# Patient Record
Sex: FEMALE | Race: WHITE | NOT HISPANIC OR LATINO | Employment: OTHER | URBAN - METROPOLITAN AREA
[De-identification: names, ages, dates, MRNs, and addresses within clinical notes are randomized per-mention and may not be internally consistent; named-entity substitution may affect disease eponyms.]

---

## 2017-01-10 ENCOUNTER — OFFICE VISIT (OUTPATIENT)
Dept: ORTHOPEDICS | Facility: CLINIC | Age: 72
End: 2017-01-10
Payer: MEDICARE

## 2017-01-10 VITALS
SYSTOLIC BLOOD PRESSURE: 121 MMHG | BODY MASS INDEX: 21.76 KG/M2 | WEIGHT: 122.81 LBS | HEIGHT: 63 IN | DIASTOLIC BLOOD PRESSURE: 72 MMHG | HEART RATE: 67 BPM

## 2017-01-10 DIAGNOSIS — M25.572 LEFT ANKLE PAIN, UNSPECIFIED CHRONICITY: Primary | ICD-10-CM

## 2017-01-10 PROCEDURE — 99213 OFFICE O/P EST LOW 20 MIN: CPT | Mod: S$PBB,,, | Performed by: PHYSICIAN ASSISTANT

## 2017-01-10 PROCEDURE — 99213 OFFICE O/P EST LOW 20 MIN: CPT | Mod: PBBFAC | Performed by: PHYSICIAN ASSISTANT

## 2017-01-10 PROCEDURE — 99999 PR PBB SHADOW E&M-EST. PATIENT-LVL III: CPT | Mod: PBBFAC,,, | Performed by: PHYSICIAN ASSISTANT

## 2017-01-10 NOTE — MR AVS SNAPSHOT
[facility]  [address]  [facility] [address]  Phone: [phone]                  Chantal Arauz   1/10/2017 2:00 PM   Appointment    Description:  Female : 1945   Provider:  Annabella Narvaez PA-C   Department:  [facility]                To Do List           Future Appointments        Provider Department Dept Phone    1/10/2017 2:00 PM Annabella Narvaez PA-C [facility] [phone]      Goals (5 Years of Data)     None      [facility]     [facility] On Call Nurse Care Line -  Assistance  Registered nurses in the [facility] On Call Center provide clinical advisement, health education, appointment booking, and other advisory services.  Call for this free service at [phone].             Medications           Message regarding Medications     Verify the changes and/or additions to your medication regime listed below are the same as discussed with your clinician today.  If any of these changes or additions are incorrect, please notify your healthcare provider.             Verify that the below list of medications is an accurate representation of the medications you are currently taking.  If none reported, the list may be blank. If incorrect, please contact your healthcare provider. Carry this list with you in case of emergency.           Current Medications     meloxicam (MOBIC) 15 MG tablet Take 1 tablet (15 mg total) by mouth once daily.           Clinical Reference Information           Allergies as of 1/10/2017     No Known Allergies      Immunizations Administered on Date of Encounter - 1/10/2017     None      [facility] Sign-Up     Activating your [facility] account is as easy as 1-2-3!     1) Visit [email or web address], select Sign Up Now, enter this activation code and your date of birth, then select Next.  [email or web address]  Expires: 2017  7:55 AM      2) Create a username and password to use when you visit [facility] in the future and  select a security question in case you lose your password and select Next.    3) Enter your e-mail address and click Sign Up!    Additional Information  If you have questions, please e-mail [email or web address] or call [phone] to talk to our [facility] staff. Remember, [facility] is NOT to be used for urgent needs. For medical emergencies, dial 911.

## 2017-01-11 NOTE — PROGRESS NOTES
Subjective:      Patient ID: Chantal Arauz is a 71 y.o. female.    Chief Complaint: No chief complaint on file.    HPI  Patient returns to clinic for f/u of her left ankle/foot pain that has been present for a few months now. No trauma. She was seen 4 weeks ago and exam was consistent with PTT tendinitis. She was placed in a boot. Today she says the pain is no better. She still has pain when walking. Her foot does feel better in the boot. Mobic provides mild relief. She was not able to get the inserts.   Review of Systems   Constitution: Negative for chills, fever and night sweats.   Cardiovascular: Negative for chest pain.   Respiratory: Negative for cough and shortness of breath.    Hematologic/Lymphatic: Does not bruise/bleed easily.   Skin: Negative for color change.   Gastrointestinal: Negative for heartburn.   Genitourinary: Negative for dysuria.   Neurological: Negative for numbness and paresthesias.   Psychiatric/Behavioral: Negative for altered mental status.   Allergic/Immunologic: Negative for persistent infections.         Objective:            General    Vitals reviewed.  Constitutional: She is oriented to person, place, and time. She appears well-developed and well-nourished.   Cardiovascular: Normal rate.    Neurological: She is alert and oriented to person, place, and time.         Left Ankle/Foot Exam     Inspection  Erythema: absent    Range of Motion   The patient has normal left ankle ROM.     Muscle Strength   The patient has normal left ankle strength.    Tests   Anterior drawer: negative  Varus tilt: negative    Other   Sensation: normal    Comments:  Minimal TTP medial foot.       Vascular Exam       Left Pulses  Dorsalis Pedis:      2+                      Assessment:       Encounter Diagnosis   Name Primary?    Left ankle pain, unspecified chronicity Yes          Plan:       Since pain has not improved, MRI was ordered. She will f/u with Dr. Barone after testing is done.

## 2017-01-20 ENCOUNTER — HOSPITAL ENCOUNTER (OUTPATIENT)
Dept: RADIOLOGY | Facility: HOSPITAL | Age: 72
Discharge: HOME | End: 2017-01-20
Attending: ORTHOPAEDIC SURGERY
Payer: MEDICARE

## 2017-01-20 DIAGNOSIS — M25.572 LEFT ANKLE PAIN, UNSPECIFIED CHRONICITY: ICD-10-CM

## 2017-01-20 PROCEDURE — 73721 MRI JNT OF LWR EXTRE W/O DYE: CPT | Mod: 26,LT,GC, | Performed by: RADIOLOGY

## 2017-01-20 PROCEDURE — 73721 MRI JNT OF LWR EXTRE W/O DYE: CPT | Mod: TC,LT

## 2017-01-26 ENCOUNTER — OFFICE VISIT (OUTPATIENT)
Dept: ORTHOPEDICS | Facility: CLINIC | Age: 72
End: 2017-01-26
Payer: MEDICARE

## 2017-01-26 VITALS
DIASTOLIC BLOOD PRESSURE: 79 MMHG | HEART RATE: 68 BPM | HEIGHT: 63 IN | WEIGHT: 122.38 LBS | SYSTOLIC BLOOD PRESSURE: 115 MMHG | BODY MASS INDEX: 21.68 KG/M2

## 2017-01-26 DIAGNOSIS — M76.822 POSTERIOR TIBIAL TENDONITIS, LEFT: Primary | ICD-10-CM

## 2017-01-26 PROCEDURE — 99213 OFFICE O/P EST LOW 20 MIN: CPT | Mod: PBBFAC | Performed by: ORTHOPAEDIC SURGERY

## 2017-01-26 PROCEDURE — 99999 PR PBB SHADOW E&M-EST. PATIENT-LVL III: CPT | Mod: PBBFAC,,, | Performed by: ORTHOPAEDIC SURGERY

## 2017-01-26 PROCEDURE — 99214 OFFICE O/P EST MOD 30 MIN: CPT | Mod: S$PBB,,, | Performed by: ORTHOPAEDIC SURGERY

## 2017-01-26 RX ORDER — MELOXICAM 7.5 MG/1
7.5 TABLET ORAL DAILY
COMMUNITY
End: 2017-02-24 | Stop reason: SDUPTHER

## 2017-01-26 NOTE — LETTER
January 26, 2017      Annabella Narvaez PA-C  [address] [facility] [address]           [facility]  [address]  [facility] [address]  Phone: [phone]          Patient: Chantal Arauz   MR Number: [id number]   YOB: 1945   Date of Visit: 1/26/2017       Dear Annabella Narvaez:    Thank you for referring Chantal Arauz to me for evaluation. Attached you will find relevant portions of my assessment and plan of care.    If you have questions, please do not hesitate to call me. I look forward to following Chantal Arauz along with you.    Sincerely,    Ana Boss MA    Enclosure  CC:  No Recipients    If you would like to receive this communication electronically, please contact [email or web address] or [phone] to request more information on [employer] Link access.    For providers and/or their staff who would like to refer a patient to [facility], please contact us through our one-stop-shop provider referral line, [facility] , at [phone].    If you feel you have received this communication in error or would no longer like to receive these types of communications, please e-mail [email or web address]

## 2017-01-26 NOTE — PROGRESS NOTES
DATE: 1/26/2017  PATIENT: Chantal Arauz    CHIEF COMPLAINT: left foot pain    HISTORY:  Chantal Arauz is a 71 y.o. female here for initial evaluation of left foot pain.  Pain began approximately 3 months ago with no preceding injury or trauma.  It is located along the medial aspect of the foot without radiation.  It is worse with weight bearing and activities.  She was seen by PA where boot immobilization and Mobic were given and she has had some relief.      PAST MEDICAL/SURGICAL HISTORY:  History reviewed. No pertinent past medical history.  Past Surgical History   Procedure Laterality Date    Back surgery       10 years ago    Shoulder surgery Left      20 to 25 years ago    Wrist sugery Right      20 years ago       Current Medications:   Current Outpatient Prescriptions:     estrogen, conjugated,-medroxyprogesterone 0.3-1.5 mg (PREMPRO) 0.3-1.5 mg per tablet, Take 1 tablet by mouth once daily., Disp: , Rfl:     meloxicam (MOBIC) 7.5 MG tablet, Take 7.5 mg by mouth once daily., Disp: , Rfl:     Social History:   Social History     Social History    Marital status:      Spouse name: N/A    Number of children: N/A    Years of education: N/A     Occupational History    Not on file.     Social History Main Topics    Smoking status: Never Smoker    Smokeless tobacco: Not on file    Alcohol use 7.2 oz/week     10 Glasses of wine, 2 Shots of liquor per week    Drug use: No    Sexual activity: Not on file     Other Topics Concern    Not on file     Social History Narrative       REVIEW OF SYSTEMS:  Constitution: Negative. Negative for chills, fever and night sweats.   Cardiovascular: Negative for chest pain and syncope.   Respiratory: Negative for cough and shortness of breath.   Gastrointestinal: See HPI. Negative for nausea/vomiting. Negative for abdominal pain.  Genitourinary: See HPI. Negative for discoloration or dysuria.  Skin: Negative for dry skin, itching and rash.  "  Hematologic/Lymphatic: Negative for bleeding problem. Does not bruise/bleed easily.   Musculoskeletal: Negative for falls and muscle weakness.   Neurological: See HPI. No seizures.   Endocrine: Negative for polydipsia, polyphagia and polyuria.   Allergic/Immunologic: Negative for hives and persistent infections.    PHYSICAL EXAMINATION:    Visit Vitals    /79 (BP Location: Right arm, Patient Position: Sitting, BP Method: Automatic)    Pulse 68    Ht 5' 3" (1.6 m)    Wt 55.5 kg (122 lb 5.7 oz)    BMI 21.67 kg/m2       General: The patient is a 71 y.o. female in no apparent distress, the patient is orientatied to person, place and time.   Psych: Normal mood and affect  HEENT:  NCAT, sclera nonicteric  Lungs:  Respirations are equal and unlabored.  CV:  2+ bilateral upper and lower extremity pulses.  Skin:  Intact throughout.  Musculoskeletal: No pain with the range of motion of the bilateral hips. No trochanteric tenderness to palpation. No pain with range of motion about the bilateral knees.    Left Foot:  - minimal flattening of arch compared to right  - ttp along PT   - able to perform single leg heel raise  - full ROM without pain  - 5/5 strength throughout  - NVI        IMAGING:     Radiographs of the left foot/ankle and MRI left foot were ordered and personally reviewed with the patient today.   - PT tendonitis    ASSESSMENT/PLAN:    Chantal was seen today for pain.    Diagnoses and all orders for this visit:    Posterior tibial tendonitis, left      No Follow-up on file.    Explained to patient the importance of the PTT in foot stability and gait.  She has gone through conservative measures of boot immobilization and NSAIDs with some relief.  We will add PT to this.  If pain worsens or does not improve, she will require PTT reconstruction.  She will return to clinic in 8 weeks.      I have personally taken the history and examined this patient and agree with the residents note as stated " above.  Stage 1 PTTD would require repair with FDL tendon transfer if conservative measures do not control symptoms

## 2017-02-21 ENCOUNTER — TELEPHONE (OUTPATIENT)
Dept: ORTHOPEDICS | Facility: CLINIC | Age: 72
End: 2017-02-21

## 2017-02-21 NOTE — TELEPHONE ENCOUNTER
----- Message from Jamel No sent at 2/21/2017 11:49 AM CST -----  Contact: self/home  Pt would like a rx for PT to be sent to [facility]. The rx can be faxed to [phone] and [facility] can be reached at [phone]. Pt would like to be discharged from [facility] and the phone number is [phone].

## 2017-02-22 ENCOUNTER — TELEPHONE (OUTPATIENT)
Dept: ORTHOPEDICS | Facility: CLINIC | Age: 72
End: 2017-02-22

## 2017-02-23 ENCOUNTER — TELEPHONE (OUTPATIENT)
Dept: ORTHOPEDICS | Facility: CLINIC | Age: 72
End: 2017-02-23

## 2017-02-23 NOTE — TELEPHONE ENCOUNTER
----- Message from Constanza Choudhury sent at 2/23/2017  2:53 PM CST -----  Contact: self@ home   Pt is calling to speak with a staff member about her being discharged from her previous PT providers.

## 2017-02-23 NOTE — TELEPHONE ENCOUNTER
Returned call. Patient said she thought she was discharged from [facility] and is worried she may be charged 50.00 for not going to her last appointment. I told her I would call them and straighten it out. I spoke to Yovany and he said they would not charge her but asked if she could come in for a discharge appointment. I said I would call her and ask her but it was up to her. I called the patient to tell her what Yovany said and left it up to her to call [place] back or not.

## 2017-02-24 ENCOUNTER — TELEPHONE (OUTPATIENT)
Dept: ORTHOPEDICS | Facility: CLINIC | Age: 72
End: 2017-02-24

## 2017-02-24 RX ORDER — MELOXICAM 7.5 MG/1
7.5 TABLET ORAL DAILY PRN
Qty: 60 TABLET | Refills: 0 | Status: SHIPPED | OUTPATIENT
Start: 2017-02-24 | End: 2017-04-21

## 2017-03-24 ENCOUNTER — OFFICE VISIT (OUTPATIENT)
Dept: ORTHOPEDICS | Facility: CLINIC | Age: 72
End: 2017-03-24
Payer: MEDICARE

## 2017-03-24 ENCOUNTER — TELEPHONE (OUTPATIENT)
Dept: ORTHOPEDICS | Facility: CLINIC | Age: 72
End: 2017-03-24

## 2017-03-24 ENCOUNTER — HOSPITAL ENCOUNTER (OUTPATIENT)
Dept: RADIOLOGY | Facility: OTHER | Age: 72
Discharge: HOME | End: 2017-03-24
Attending: ORTHOPAEDIC SURGERY | Admitting: ORTHOPAEDIC SURGERY
Payer: MEDICARE

## 2017-03-24 VITALS
SYSTOLIC BLOOD PRESSURE: 113 MMHG | HEIGHT: 63 IN | BODY MASS INDEX: 21.62 KG/M2 | DIASTOLIC BLOOD PRESSURE: 77 MMHG | HEART RATE: 80 BPM | WEIGHT: 122 LBS

## 2017-03-24 DIAGNOSIS — M79.642 LEFT HAND PAIN: ICD-10-CM

## 2017-03-24 DIAGNOSIS — M79.642 LEFT HAND PAIN: Primary | ICD-10-CM

## 2017-03-24 DIAGNOSIS — M18.12 PRIMARY OSTEOARTHRITIS OF FIRST CARPOMETACARPAL JOINT OF LEFT HAND: Primary | ICD-10-CM

## 2017-03-24 DIAGNOSIS — M25.442 SWELLING OF HAND JOINT, LEFT: ICD-10-CM

## 2017-03-24 PROCEDURE — 99214 OFFICE O/P EST MOD 30 MIN: CPT | Mod: 25,S$PBB,, | Performed by: PHYSICIAN ASSISTANT

## 2017-03-24 PROCEDURE — 99999 PR PBB SHADOW E&M-EST. PATIENT-LVL III: CPT | Mod: PBBFAC,,, | Performed by: PHYSICIAN ASSISTANT

## 2017-03-24 PROCEDURE — 73130 X-RAY EXAM OF HAND: CPT | Mod: 26,LT,, | Performed by: RADIOLOGY

## 2017-03-24 PROCEDURE — 97760 ORTHOTIC MGMT&TRAING 1ST ENC: CPT | Mod: GP,S$PBB,, | Performed by: PHYSICIAN ASSISTANT

## 2017-03-24 PROCEDURE — 77002 NEEDLE LOCALIZATION BY XRAY: CPT | Mod: 26,S$PBB,, | Performed by: PHYSICIAN ASSISTANT

## 2017-03-24 PROCEDURE — 99213 OFFICE O/P EST LOW 20 MIN: CPT | Mod: PBBFAC | Performed by: PHYSICIAN ASSISTANT

## 2017-03-24 PROCEDURE — 77002 NEEDLE LOCALIZATION BY XRAY: CPT | Mod: PBBFAC | Performed by: PHYSICIAN ASSISTANT

## 2017-03-24 PROCEDURE — 20600 DRAIN/INJ JOINT/BURSA W/O US: CPT | Mod: PBBFAC | Performed by: PHYSICIAN ASSISTANT

## 2017-03-24 PROCEDURE — 20600 DRAIN/INJ JOINT/BURSA W/O US: CPT | Mod: S$PBB,LT,, | Performed by: PHYSICIAN ASSISTANT

## 2017-03-24 RX ORDER — IBUPROFEN 800 MG/1
800 TABLET ORAL
Qty: 61 TABLET | Refills: 0 | Status: SHIPPED | OUTPATIENT
Start: 2017-03-24 | End: 2017-04-23

## 2017-03-24 RX ORDER — TRIAMCINOLONE ACETONIDE 40 MG/ML
20 INJECTION, SUSPENSION INTRA-ARTICULAR; INTRAMUSCULAR
Status: COMPLETED | OUTPATIENT
Start: 2017-03-24 | End: 2017-03-24

## 2017-03-24 RX ORDER — METHYLPREDNISOLONE 4 MG/1
TABLET ORAL
Qty: 1 PACKAGE | Refills: 0 | Status: SHIPPED | OUTPATIENT
Start: 2017-03-24 | End: 2017-04-14

## 2017-03-24 RX ORDER — DEXAMETHASONE SODIUM PHOSPHATE 4 MG/ML
4 INJECTION, SOLUTION INTRA-ARTICULAR; INTRALESIONAL; INTRAMUSCULAR; INTRAVENOUS; SOFT TISSUE
Status: DISCONTINUED | OUTPATIENT
Start: 2017-03-24 | End: 2017-03-24

## 2017-03-24 RX ADMIN — TRIAMCINOLONE ACETONIDE 20 MG: 40 INJECTION, SUSPENSION INTRA-ARTICULAR; INTRAMUSCULAR at 02:03

## 2017-03-24 NOTE — PATIENT INSTRUCTIONS
What Is Basal Joint Arthritis?  Arthritis is a disease that causes inflammation and stiffness in the joints. It often affects the joint at the base of the thumb, called the basal joint. Basal joint arthritis is most common in women over 40, but anyone can get it. Often it happens in both thumbs.    Causes  Basal joint arthritis occurs as a result of wear and tear on the joint. It is more likely to occur, and at a younger age, if you have fractured or injured your thumb. Repeatedly gripping, twisting, or turning objects with the thumb and fingers may make the arthritis worse.  Inside your thumb  The basal joint is formed by one of the wrist bones and the first of the three bones in the thumb. This joint allows the thumb to move and to pinch with the fingers. When arthritis occurs in the basal joint, it slowly destroys the joint.  Arthritis irritates or destroys the joint  The ends of the bones are covered with cartilage. This covering acts like a cushion, allowing the bones to move smoothly. Arthritis wears away or destroys the cartilage. Then the bones rub against each other when you move your thumb. This causes the joint to become stiff, inflamed, and painful. This makes pinching and grasping with the thumb and fingers painful. With time, the bone in the thumb may collapse. Then you can no longer straighten your thumb.    Symptoms  The most common symptom is pain in the lower part of the thumb. You may feel pain when you lift something with the thumb and fingers, unscrew a jar lid,  an object, or turn a door handle or a key. You may find yourself dropping things. Weather may also make the thumb hurt. The joint may swell, and with time the thumb may become stiff or deformed.   Date Last Reviewed: 9/29/2015 © 2000-2016 [employer]. [address] [place] [address], [place] [address]. All rights reserved. This information is not intended as a substitute for professional medical care. Always follow your  healthcare professional's instructions.        Treating Basal Joint Arthritis  This type of arthritis affects the joint at the base of your thumb. Your treatment will depend on how severe the pain is and how worn the joint is.  Nonsurgical treatment  If arthritis is diagnosed early, it often responds to treatment without surgery. Your doctor may put a splint on your thumb for 3 to 6 weeks. This limits movement and helps reduce the inflammation. You may be given a pain medicine such as acetaminophen. You may also be given oral anti-inflammatory medicine, such as ibuprofen or aspirin. If your symptoms dont improve, your doctor may give you injections of an anti-inflammatory medicine, such as cortisone, right into the joint.  Surgical treatment  If nonsurgical treatment doesnt relieve the pain and stiffness, or if arthritis has destroyed the joint, your doctor may recommend surgery. The diseased joint is removed. Then the joint is rebuilt, usually with a piece of tendon (graft) taken from your arm or wrist. Your arm is anesthetized (numbed) so you dont feel anything during surgery. You can usually go home the d     The diseased joint is removed and replaced with a tendon graft taken from your wrist or arm. You'll have less pain and be able to use your thumb again.     ay of surgery.  Your recovery after surgery  First your hand will be wrapped in a dressing. Then youll have a cast or a splint on your thumb for 4 to 8 weeks. Occasionally, a pin will be placed during the procedure. These help keep the thumb stable while it heals. Once you can move your thumb, your doctor will give you exercises, or refer you to a physical therapist, to help strengthen the muscles and make the joint more flexible. Full recovery may take several months.  Date Last Reviewed: 9/29/2015  © 2626-4996 [employer]. [address] [place] [address], [place] [address]. All rights reserved. This information is not intended as a substitute  for professional medical care. Always follow your healthcare professional's instructions.

## 2017-03-24 NOTE — MR AVS SNAPSHOT
[facility]  [address], [address]  [facility] [address]  Phone: [phone]                  Chantal Snowden   3/24/2017 2:00 PM   Office Visit    Description:  Female : 1945   Provider:  Chelsi Alva PA-C   Department:  [facility]           Reason for Visit     Left Hand - Pain           Diagnoses this Visit        Comments    Primary osteoarthritis of first carpometacarpal joint of left hand    -  Primary     Swelling of hand joint, left                To Do List           Future Appointments        Provider Department Dept Phone    2017 2:00 PM Zhou Barone MD [facility] - Orthopedics [phone]    2017 2:15 PM Chelsi Alva PA-C [facility] [phone]      Goals (5 Years of Data)     None       These Medications        Disp Refills Start End    methylPREDNISolone (MEDROL DOSEPACK) 4 mg tablet 1 Package 0 3/24/2017 2017    use as directed    Pharmacy: [facility]/pharmacy #[id number] [address] Ph #: [phone]       ibuprofen (ADVIL,MOTRIN) 800 MG tablet 61 tablet 0 3/24/2017 2017    Take 1 tablet (800 mg total) by mouth 3 (three) times daily with meals. - Oral    [id number] [place] [address] [facility] Ph #: [phone]         [facility]     [facility] On Call Nurse Care Line -  Assistance  Registered nurses in the [facility] provide clinical advisement, health education, appointment booking, and other advisory services.  Call for this free service at [phone].             Medications           Message regarding Medications     Verify the changes and/or additions to your medication regime listed below are the same as discussed with your clinician today.  If any of these changes or additions are incorrect, please notify your healthcare provider.        START taking these NEW medications        Refills    methylPREDNISolone (MEDROL  "DOSEPACK) 4 mg tablet 0    Sig: use as directed    Class: Normal    ibuprofen (ADVIL,MOTRIN) 800 MG tablet 0    Sig: Take 1 tablet (800 mg total) by mouth 3 (three) times daily with meals.    Class: Normal    Route: Oral      These medications were administered today        Dose Freq    triamcinolone acetonide injection 20 mg 20 mg Clinic/HOD 1 time    Sig: Inject 0.5 mLs (20 mg total) into the muscle one time.    Class: Normal    Route: Intramuscular           Verify that the below list of medications is an accurate representation of the medications you are currently taking.  If none reported, the list may be blank. If incorrect, please contact your healthcare provider. Carry this list with you in case of emergency.           Current Medications     estrogen, conjugated,-medroxyprogesterone 0.3-1.5 mg (PREMPRO) 0.3-1.5 mg per tablet Take 1 tablet by mouth once daily.    ibuprofen (ADVIL,MOTRIN) 800 MG tablet Take 1 tablet (800 mg total) by mouth 3 (three) times daily with meals.    meloxicam (MOBIC) 7.5 MG tablet Take 1 tablet (7.5 mg total) by mouth daily as needed for Pain.    methylPREDNISolone (MEDROL DOSEPACK) 4 mg tablet use as directed           Clinical Reference Information           Your Vitals Were     BP Pulse Height Weight BMI    113/77 80 5' 3" (1.6 m) 55.3 kg (122 lb) 21.61 kg/m2      Blood Pressure          Most Recent Value    BP  113/77      Allergies as of 3/24/2017     No Known Allergies      Immunizations Administered on Date of Encounter - 3/24/2017     None      Orders Placed During Today's Visit     Future Labs/Procedures Expected by Expires    THERON  3/24/2017 5/23/2018    C-reactive protein  3/24/2017 5/23/2018    CBC auto differential  3/24/2017 5/23/2018    Cyclic citrul peptide antibody, IgG  3/24/2017 5/23/2018    Hepatitis C antibody  3/24/2017 5/23/2018    HLA B27 Antigen  3/24/2017 5/23/2018    Rheumatoid factor  3/24/2017 5/23/2018    Sedimentation rate, manual  3/24/2017 5/23/2018 "    URIC ACID  3/24/2017 5/23/2018      Administrations This Visit     triamcinolone acetonide injection 20 mg     Admin Date Action Dose Route Administered By             03/24/2017 Given 20 mg Intramuscular Chelsi Alva PA-C                      [facility] Sign-Up     Activating your [facility] account is as easy as 1-2-3!     1) Visit [email or web address], select Sign Up Now, enter this activation code and your date of birth, then select Next.  [email or web address]  Expires: 5/8/2017  2:35 PM      2) Create a username and password to use when you visit [facility] in the future and select a security question in case you lose your password and select Next.    3) Enter your e-mail address and click Sign Up!    Additional Information  If you have questions, please e-mail [email or web address] or call [phone] to talk to our [facility] staff. Remember, [facility] is NOT to be used for urgent needs. For medical emergencies, dial 911.         Instructions      What Is Basal Joint Arthritis?  Arthritis is a disease that causes inflammation and stiffness in the joints. It often affects the joint at the base of the thumb, called the basal joint. Basal joint arthritis is most common in women over 40, but anyone can get it. Often it happens in both thumbs.    Causes  Basal joint arthritis occurs as a result of wear and tear on the joint. It is more likely to occur, and at a younger age, if you have fractured or injured your thumb. Repeatedly gripping, twisting, or turning objects with the thumb and fingers may make the arthritis worse.  Inside your thumb  The basal joint is formed by one of the wrist bones and the first of the three bones in the thumb. This joint allows the thumb to move and to pinch with the fingers. When arthritis occurs in the basal joint, it slowly destroys the joint.  Arthritis irritates or destroys the joint  The ends of the bones are covered with cartilage. This covering acts like a cushion, allowing  the bones to move smoothly. Arthritis wears away or destroys the cartilage. Then the bones rub against each other when you move your thumb. This causes the joint to become stiff, inflamed, and painful. This makes pinching and grasping with the thumb and fingers painful. With time, the bone in the thumb may collapse. Then you can no longer straighten your thumb.    Symptoms  The most common symptom is pain in the lower part of the thumb. You may feel pain when you lift something with the thumb and fingers, unscrew a jar lid,  an object, or turn a door handle or a key. You may find yourself dropping things. Weather may also make the thumb hurt. The joint may swell, and with time the thumb may become stiff or deformed.   Date Last Reviewed: 9/29/2015 © 2000-2016 [employer]. [address] [place] [id number]. All rights reserved. This information is not intended as a substitute for professional medical care. Always follow your healthcare professional's instructions.        Treating Basal Joint Arthritis  This type of arthritis affects the joint at the base of your thumb. Your treatment will depend on how severe the pain is and how worn the joint is.  Nonsurgical treatment  If arthritis is diagnosed early, it often responds to treatment without surgery. Your doctor may put a splint on your thumb for 3 to 6 weeks. This limits movement and helps reduce the inflammation. You may be given a pain medicine such as acetaminophen. You may also be given oral anti-inflammatory medicine, such as ibuprofen or aspirin. If your symptoms dont improve, your doctor may give you injections of an anti-inflammatory medicine, such as cortisone, right into the joint.  Surgical treatment  If nonsurgical treatment doesnt relieve the pain and stiffness, or if arthritis has destroyed the joint, your doctor may recommend surgery. The diseased joint is removed. Then the joint is rebuilt, usually with a piece of tendon  (graft) taken from your arm or wrist. Your arm is anesthetized (numbed) so you dont feel anything during surgery. You can usually go home the d     The diseased joint is removed and replaced with a tendon graft taken from your wrist or arm. You'll have less pain and be able to use your thumb again.     ay of surgery.  Your recovery after surgery  First your hand will be wrapped in a dressing. Then youll have a cast or a splint on your thumb for 4 to 8 weeks. Occasionally, a pin will be placed during the procedure. These help keep the thumb stable while it heals. Once you can move your thumb, your doctor will give you exercises, or refer you to a physical therapist, to help strengthen the muscles and make the joint more flexible. Full recovery may take several months.  Date Last Reviewed: 9/29/2015  © 8626-3941 [employer]. [address] [place] [address]. All rights reserved. This information is not intended as a substitute for professional medical care. Always follow your healthcare professional's instructions.             Language Assistance Services     ATTENTION: Language assistance services are available, free of charge. Please call [phone].      ATENCIÓN: Si sweetie romulo, tiene a farias disposición servicios gratuitos de asistencia lingüística. Llame al [phone].     CHRISTINE Ý: N?u b?n nói Ti?ng Vi?t, có các d?ch v? h? tr? ngôn ng? mi?n phí dành cho b?n. G?i s? [phone].         [facility] complies with applicable Federal civil rights laws and does not discriminate on the basis of race, color, national origin, age, disability, or sex.

## 2017-03-24 NOTE — PROGRESS NOTES
This office note has been dictated.   Dictation Confirmation Code: [id number]  Tarah Alva PA-C  03/24/2017  3:30 PM  Supervising Physician:  Dr. Divine Garcia, MD Cornejo was seen today for pain.    Diagnoses and all orders for this visit:    Primary osteoarthritis of first carpometacarpal joint of left hand  -     Discontinue: dexamethasone injection 4 mg; 1 mL (4 mg total) by Other route one time.  -     methylPREDNISolone (MEDROL DOSEPACK) 4 mg tablet; use as directed  -     ibuprofen (ADVIL,MOTRIN) 800 MG tablet; Take 1 tablet (800 mg total) by mouth 3 (three) times daily with meals.  -     triamcinolone acetonide injection 20 mg; Inject 0.5 mLs (20 mg total) into the muscle one time.  -     Cancel: Ambulatory Referral to Orthopedics  -     Hepatitis C antibody; Future  -     Sedimentation rate, manual; Future  -     C-reactive protein; Future  -     THERON; Future  -     Cyclic citrul peptide antibody, IgG; Future  -     Rheumatoid factor; Future  -     HLA B27 Antigen; Future  -     CBC auto differential; Future  -     URIC ACID; Future    Swelling of hand joint, left  -     CBC auto differential; Future  -     URIC ACID; Future    PROCEDURE NOTE:  She has a diagnosis of CMC osteoarthritis. We have discussed surgical and non-surgical options at this point. Risks and benefits of corticosteroid injections discussed in detail. Patient wishes to proceed.    After cortisone consent and post injection information was given, the  left  wrist was prepped with alcohol. The left thumb CMC joint was injected with 20mg kenalogand 1 cc lidocaine. Needle guidance with assistance of Fluoroscopy. The patient tolerated the injection well.     We performed a custom orthotic/brace fitting, adjusting and training with the patient. The patient demonstrated understanding and proper care. This was performed for 15 minutes.

## 2017-03-27 NOTE — PROGRESS NOTES
CHIEF COMPLAINT:  Severe left thumb pain.    HISTORY OF PRESENT ILLNESS:  Ms. Snowden is a very pleasant right-hand dominant   female presenting today for severe left thumb pain.  She reports that for the   last five days her hand has been severely swollen and painful.  She reports that   she used to be a  and had to open a great deal of bottles and   she started having slight pain there; however, for the last two years, it has   gotten increasingly worse and in the last five days, it started to swell and it   became red.  She denies any nausea, vomiting, fever or chills.  She denies any   trauma to the hand.  She reports she has not been doing any increasing activity   of using her hand other than normal daily stuff.  She is not sure why it has   flared up.  She denies any history of rheumatoid, she reports she has not been   checked for that, however.  She denies paresthesias in the hand.    History reviewed. No pertinent past medical history.    Past Surgical History:   Procedure Laterality Date    BACK SURGERY      10 years ago    SHOULDER SURGERY Left     20 to 25 years ago    wrist sugery Right     20 years ago       Social History     Social History    Marital status:      Spouse name: N/A    Number of children: N/A    Years of education: N/A     Occupational History    Not on file.     Social History Main Topics    Smoking status: Never Smoker    Smokeless tobacco: Not on file    Alcohol use 7.2 oz/week     10 Glasses of wine, 2 Shots of liquor per week    Drug use: No    Sexual activity: Not on file     Other Topics Concern    Not on file     Social History Narrative       Current Outpatient Prescriptions on File Prior to Visit   Medication Sig Dispense Refill    estrogen, conjugated,-medroxyprogesterone 0.3-1.5 mg (PREMPRO) 0.3-1.5 mg per tablet Take 1 tablet by mouth once daily.      meloxicam (MOBIC) 7.5 MG tablet Take 1 tablet (7.5 mg total) by mouth daily as  "needed for Pain. 60 tablet 0     No current facility-administered medications on file prior to visit.        Review of patient's allergies indicates:  No Known Allergies    Review of Systems:  Constitutional: no fever or chills  ENT: no nasal congestion or sore throat  Respiratory: no cough or shortness of breath  Cardiovascular: no chest pain or palpitations  Gastrointestinal: no nausea or vomiting  Genitourinary: no hematuria or dysuria  Integument/Breast: no rash or pruritis  Hematologic/Lymphatic: no easy bruising or lymphadenopathy  Musculoskeletal: see HPI  Neurological: no seizures or tremors  Behavioral/Psych: no auditory or visual hallucinations      PHYSICAL EXAM    Vitals:    03/24/17 1413   BP: 113/77   Pulse: 80   Weight: 55.3 kg (122 lb)   Height: 5' 3" (1.6 m)   PainSc:   4   PainLoc: Hand       GENERAL:  Well developed, well nourished, no acute distress.  CARDIOVASCULAR:  Regular rate and rhythm.  LUNGS:  Respirations equal and unlabored.  BEHAVIORAL AND PSYCH:  Mood and affect appropriate.  NEUROLOGIC:  No tremor.  HEENT:  Normocephalic, atraumatic.  MUSCULOSKELETAL:  Upper extremity exam, distally neurovascularly intact.  AIN,   PIN nerves are intact.  Sensation over the radial, ulnar and median nerves is   equivocal.  She has Z deformity present in the thumb with the shoulder deformity   present.  Hyperextension at the MCP.  She has severe edema noted at the base of   the thumb CMC with boggy synovitis located around the joint.  There is some   erythema; however, it is not tender to light palpation.  She does have   tenderness over the thumb CMC with deep palpation.  She has some laxity of her   thumb CMC joint.  Negative DRUJ tenderness.  Negative Tinel's.  Negative   Durkan's.    X-ray imaging shows severe thumb CMC arthritis of the left thumb with   subluxation of the first metacarpal.    ASSESSMENT:  Severe thumb carpometacarpal arthritis with acute flare.    PLAN:  I discussed with Ms. " Isi at this time she does have thumb CMC   arthritis that is undergoing an acute flare.  I cannot rule out gout or   inflammatory arthritis at this time.  Therefore, labs have been ordered.  We   also will evaluate her for rheumatoid.  At this time, she is having an acute   flare of the arthritis.  We will to try steroid injection and due to the   severity of her pain, we will also try a Medrol Dosepak to help with the   inflammation in her thumb as it is significantly edematous.  She will follow up   with me in two weeks for reevaluation, start Medrol Dosepak and   anti-inflammatories and we did a thumb CMC injection for her today for pain   control.  The patient had good pain control after that.  She was given a thumb   CMC brace fitted by myself.  Contact us with questions, concerns or problems.    SUPERVISING PHYSICIAN:  Divine Garcia M.D.    DICTATED BY:  MARIE Rodríguez  dd: 03/24/2017 15:35:17 (CDT)  td: 03/25/2017 05:15:17 (CDT)  Doc ID   #[id number]  Job ID #[id number]    CC:

## 2017-04-07 ENCOUNTER — TELEPHONE (OUTPATIENT)
Dept: ORTHOPEDICS | Facility: CLINIC | Age: 72
End: 2017-04-07

## 2017-04-10 ENCOUNTER — OFFICE VISIT (OUTPATIENT)
Dept: ORTHOPEDICS | Facility: CLINIC | Age: 72
End: 2017-04-10
Payer: MEDICARE

## 2017-04-10 VITALS
WEIGHT: 122 LBS | HEART RATE: 76 BPM | SYSTOLIC BLOOD PRESSURE: 105 MMHG | RESPIRATION RATE: 18 BRPM | HEIGHT: 63 IN | DIASTOLIC BLOOD PRESSURE: 64 MMHG | BODY MASS INDEX: 21.62 KG/M2

## 2017-04-10 DIAGNOSIS — M18.12 PRIMARY OSTEOARTHRITIS OF FIRST CARPOMETACARPAL JOINT OF LEFT HAND: Primary | ICD-10-CM

## 2017-04-10 PROCEDURE — 99999 PR PBB SHADOW E&M-EST. PATIENT-LVL III: CPT | Mod: PBBFAC,,, | Performed by: PHYSICIAN ASSISTANT

## 2017-04-10 PROCEDURE — 99213 OFFICE O/P EST LOW 20 MIN: CPT | Mod: PBBFAC | Performed by: PHYSICIAN ASSISTANT

## 2017-04-10 PROCEDURE — 99213 OFFICE O/P EST LOW 20 MIN: CPT | Mod: S$PBB,,, | Performed by: PHYSICIAN ASSISTANT

## 2017-04-13 NOTE — PROGRESS NOTES
This office note has been dictated.   Dictation Confirmation Code: [id number]  Tarah Alva PA-C  04/13/2017  4:48 PM  Supervising Physician:  MD Chantal Strauss was seen today for pain.    Diagnoses and all orders for this visit:    Primary osteoarthritis of first carpometacarpal joint of left hand

## 2017-04-17 NOTE — PROGRESS NOTES
"SUBJECTIVE:  Ms. Snowden is 2 weeks status post injection of her thumb CMC as   well as steroid pack.  She feels that she is doing excellent.  She denies   nausea, vomiting, fever or chills.  She feels that things are doing much better.    Splinting is helping.  She is not having anywhere near the pain that she was   having prior.  She denies any new problems today.    OBJECTIVE:  Vitals:    04/10/17 1426   BP: 105/64   Pulse: 76   Resp: 18   Weight: 55.3 kg (122 lb)   Height: 5' 3" (1.6 m)   PainSc: 0-No pain   PainLoc: Hand       MUSCULOSKELETAL:  Upper Extremity Exam:  The edema in the dorsal aspect of the   hand as well as the thumb CMC joint has resolved.  She still has shoulder   deformity present from the thumb CMC, still mildly tender to palpation, but she   has great range of motion.  No warmth.  No erythema.    ASSESSMENT:  Thumb CMC acute flare arthritis, resolving.    PLAN:  I discussed with Ms. Snowden she does have thumb CMC arthritis; however,   feels that she had an acute flare with synovitis, feels this has resolved with   both medications and injections.  She did not fully take all the steroids, which   is fine.  She is doing very well.  She will follow up with me on a p.r.n.   basis.    DICTATED BY:  MARIE Yeh  dd: 04/13/2017 16:53:10 (CDT)  td: 04/14/2017 09:31:29 (CDT)  Doc ID   #[id number]  Job ID #[id number]    CC:     "

## 2017-04-21 ENCOUNTER — OFFICE VISIT (OUTPATIENT)
Dept: ORTHOPEDICS | Facility: CLINIC | Age: 72
End: 2017-04-21
Payer: MEDICARE

## 2017-04-21 VITALS — HEIGHT: 63 IN | BODY MASS INDEX: 21.53 KG/M2 | WEIGHT: 121.5 LBS

## 2017-04-21 DIAGNOSIS — M76.822 POSTERIOR TIBIAL TENDONITIS, LEFT: Primary | ICD-10-CM

## 2017-04-21 PROCEDURE — 99999 PR PBB SHADOW E&M-EST. PATIENT-LVL II: CPT | Mod: PBBFAC,,, | Performed by: ORTHOPAEDIC SURGERY

## 2017-04-21 PROCEDURE — 99212 OFFICE O/P EST SF 10 MIN: CPT | Mod: PBBFAC | Performed by: ORTHOPAEDIC SURGERY

## 2017-04-21 PROCEDURE — 99213 OFFICE O/P EST LOW 20 MIN: CPT | Mod: S$PBB,,, | Performed by: ORTHOPAEDIC SURGERY

## 2017-04-21 RX ORDER — LORAZEPAM 1 MG/1
1 TABLET ORAL
COMMUNITY
Start: 2017-04-08 | End: 2024-03-18

## 2017-04-21 RX ORDER — OMEPRAZOLE 20 MG/1
CAPSULE, DELAYED RELEASE ORAL
Refills: 3 | COMMUNITY
Start: 2017-03-30

## 2017-04-21 RX ORDER — ATORVASTATIN CALCIUM 20 MG/1
20 TABLET, FILM COATED ORAL DAILY
Refills: 3 | COMMUNITY
Start: 2017-02-10 | End: 2024-02-26

## 2017-04-22 NOTE — PROGRESS NOTES
Ms. Snowden returns today.  I saw her three months ago for a three-month   history of medial hindfoot pain consistent with left posterior tibial   tendinitis.  She did not have any significant deformity of her foot and I   recommended completing a course of boot mobilization as well as physical   therapy.  She returns today for followup.  She mainly reports she did use the   boot that much.  She did go to a couple of therapies, but her  is sick   and she has been having to take care of him.  Nevertheless, she reports her   symptoms are not worsened, may be a bit better.  On exam today, there is still   some tenderness over the posterior tibial tendon medially.  There is mild   swelling.  She has painless motion of her ankle and subtalar joint.  She was   able to do a single limb heel rise on the left, but with some discomfort.  I   again reminded her of the importance of wearing supportive shoes.  As long as   her symptoms are not worsening, we can continue to treat this nonoperatively.    She was really unable to have any surgical intervention at this point.  I am   going to have her return to see me as needed.  If she has a flare-up of   symptoms, she will call and let us know.      KENNETH/LIYA  dd: 04/21/2017 12:58:54 (CDT)  td: 04/22/2017 03:41:49 (LEO)  Doc ID   #[id number]  Job ID #[id number]    CC:

## 2020-05-20 ENCOUNTER — OFFICE VISIT (OUTPATIENT)
Dept: URGENT CARE | Facility: CLINIC | Age: 75
End: 2020-05-20
Payer: MEDICARE

## 2020-05-20 VITALS
BODY MASS INDEX: 21.44 KG/M2 | HEIGHT: 63 IN | TEMPERATURE: 98 F | SYSTOLIC BLOOD PRESSURE: 137 MMHG | DIASTOLIC BLOOD PRESSURE: 65 MMHG | WEIGHT: 121 LBS | HEART RATE: 83 BPM | OXYGEN SATURATION: 98 %

## 2020-05-20 DIAGNOSIS — S99.921A INJURY OF TOE ON RIGHT FOOT, INITIAL ENCOUNTER: Primary | ICD-10-CM

## 2020-05-20 DIAGNOSIS — S91.132A PUNCTURE WOUND OF GREAT TOE OF LEFT FOOT, INITIAL ENCOUNTER: ICD-10-CM

## 2020-05-20 PROCEDURE — 99204 PR OFFICE/OUTPT VISIT, NEW, LEVL IV, 45-59 MIN: ICD-10-PCS | Mod: S$GLB,,, | Performed by: NURSE PRACTITIONER

## 2020-05-20 PROCEDURE — 73630 XR FOOT COMPLETE 3 VIEW RIGHT: ICD-10-PCS | Mod: RT,S$GLB,, | Performed by: RADIOLOGY

## 2020-05-20 PROCEDURE — 73630 X-RAY EXAM OF FOOT: CPT | Mod: RT,S$GLB,, | Performed by: RADIOLOGY

## 2020-05-20 PROCEDURE — 99204 OFFICE O/P NEW MOD 45 MIN: CPT | Mod: S$GLB,,, | Performed by: NURSE PRACTITIONER

## 2020-05-20 RX ORDER — PRAVASTATIN SODIUM 40 MG/1
40 TABLET ORAL DAILY
COMMUNITY
Start: 2020-03-23 | End: 2024-02-26

## 2020-05-20 RX ORDER — SULFAMETHOXAZOLE AND TRIMETHOPRIM 800; 160 MG/1; MG/1
1 TABLET ORAL 2 TIMES DAILY
Qty: 20 TABLET | Refills: 0 | Status: SHIPPED | OUTPATIENT
Start: 2020-05-20 | End: 2020-05-30

## 2020-05-20 NOTE — PATIENT INSTRUCTIONS
Puncture Wound: Foot       A puncture wound occurs when a pointed object (such as a nail) pushes into the skin. It may go into the tissues below the skin of the foot, including fat and muscle. This type of wound is narrow and deep. They can be hard to clean. Puncture wounds are at high risk for becoming infected. One type of serious infection is more likely if you were wearing a rubber-soled shoe at the time of injury. Bacteria from the sole of the shoe may be dragged into the wound. Symptoms of infection may appear as late as 2 to 3 weeks after the injury. Be sure to watch for symptoms of infection and call your healthcare provider right away if any them appear.  X-rays may be done to see whether any objects remain under the skin. Your may also need a tetanus shot. This is given if you are not up to date on this vaccination and the object that caused the wound may lead to tetanus.  Puncture wounds can easily become infected.   Home care  · When you sit or lie down, raise the foot above the level of your heart. This helps reduce swelling and pain.  · Do not put weight on the injured foot if it hurts to do so or if you were told to keep weight off the injury.  · Your healthcare provider may prescribe an antibiotic. This is to help prevent infection. Follow all instructions for taking this medicine. Take the medicine every day until it is gone or you are told to stop. You should not have any left over.  · The healthcare provider may prescribe medicines for pain. Follow instructions for taking them.  · You can take acetaminophen or ibuprofen for pain, unless you were given a different pain medicine to use.   · Follow the healthcare providers instructions on how to care for the wound.  · Keep the wound clean and dry. Do not get the wound wet until you are told it is okay to do so. If the area gets wet, gently pat it dry with a clean cloth. Replace the wet bandage with a dry one.  · If a bandage was applied and it  becomes wet or dirty, replace it. Otherwise, leave it in place for the first 24 hours.  · Once you can get the wound wet, you may shower as usual but do not soak the wound in water (no tub baths or swimming)  · Check the wound daily for symptoms of infection. These include:  ¨ Increasing redness or swelling around the wound  ¨ Increased warmth of the wound  ¨ Worsening pain  ¨ Red streaking lines away from the wound  ¨ Draining pus  Follow-up care  Follow up with your healthcare provider as advised.   When to seek medical advice  Call your healthcare provider right away if any of these occur:  · Any symptoms of infection (listed above)  · Fever of 100.4°F (38.ºC) or higher, or as directed by your healthcare provider  · Wound changes colors  · Numbness around the wound  · Decreased movement around the injured area  Date Last Reviewed: 8/24/2015  © 5797-6724 [employer]. [address] [place] [address]. All rights reserved. This information is not intended as a substitute for professional medical care. Always follow your healthcare professional's instructions.        Puncture Wound       A puncture wound occurs when a pointed object pushes into the skin. It may go into the tissues below the skin, including fat and muscle. This type of wound is narrow and deep and can be hard to clean. Because of this, puncture wounds are at high risk for becoming infected.  X-rays may be done to check the wound for objects stuck under the skin. Your may also need a tetanus shot. This is given if you are not up to date on this vaccination and the object that caused the wound may lead to tetanus.  Home care  · Your healthcare provider may prescribe an antibiotic. This is to help prevent infection. Follow all instructions for taking this medicine. Take the medicine every day until it is gone or you are told to stop. You should not have any left over.  · The healthcare provider may prescribe medicines for pain.  Follow instructions for taking them.  · You can take acetaminophen or ibuprofen for pain, unless you were given a different pain medicine to use.   · Follow the healthcare providers instructions on how to care for the wound.  · Keep the wound clean and dry. Do not get the wound wet until you are told it is okay to do so. If the area gets wet, gently pat it dry with a clean cloth. Replace the wet bandage with a dry one.  · If a bandage was applied and it becomes wet or dirty, replace it. Otherwise, leave it in place for the first 24 hours.  · Once you can get the wound wet, you may shower as usual but do not soak the wound in water (no tub baths or swimming)  · Even with proper treatment, a puncture wound may become infected. Check the wound daily for signs of infection listed below.  Follow-up care  Follow up with your healthcare provider as advised.   When to seek medical advice  Call your healthcare provider right away if any of these occur:  · Signs of infection, including:  ¨ Increasing redness or swelling around the wound  ¨ Increased warmth of the wound  ¨ Worsening pain  ¨ Red streaking lines away from the wound  ¨ Draining pus  · Fever of 100.4°F (38.ºC) or higher or as directed by your healthcare provider  · Wound changes colors  · Numbness around the wound  · Decreased movement around the injured area  Date Last Reviewed: 8/24/2015 © 2000-2017 [employer]. [address] [place] [address], [place] [address]. All rights reserved. This information is not intended as a substitute for professional medical care. Always follow your healthcare professional's instructions.

## 2020-05-20 NOTE — PROGRESS NOTES
"Subjective:       Patient ID: Chantal Snowden is a 74 y.o. female.    Vitals:  height is 5' 3" (1.6 m) and weight is 54.9 kg (121 lb). Her tympanic temperature is 98.3 °F (36.8 °C). Her blood pressure is 137/65 and her pulse is 83. Her oxygen saturation is 98%.     Chief Complaint: Foot Injury    Patient presents with c. o walking near a construction site and stepped on a broke  that went thru her Right big toe that happened yesterday.patient is having mild redness and some swelling. Patient took some advil for the pain but had some relief. Patient states that it hurts a little when walking.   States her tetanus is also out of date.    Foot Injury    The incident occurred 12 to 24 hours ago. The incident occurred in the street. The injury mechanism was a direct blow. The pain is present in the right toes (Great toe). The quality of the pain is described as aching. The pain is at a severity of 4/10. The pain is mild. The pain has been constant since onset. Associated symptoms include a loss of motion and numbness. It is unknown if a foreign body is present. The symptoms are aggravated by movement, palpation and weight bearing. She has tried NSAIDs for the symptoms. The treatment provided mild relief.       Constitution: Negative for fatigue.   HENT: Negative for facial swelling and facial trauma.    Neck: Negative for neck stiffness.   Cardiovascular: Negative for chest trauma.   Eyes: Negative for eye trauma, double vision and blurred vision.   Gastrointestinal: Negative for abdominal trauma, abdominal pain and rectal bleeding.   Genitourinary: Negative for hematuria, missed menses, genital trauma and pelvic pain.   Musculoskeletal: Positive for pain, trauma and joint swelling. Negative for abnormal ROM of joint.   Skin: Positive for erythema. Negative for color change, wound, abrasion, laceration and bruising.   Neurological: Positive for numbness. Negative for dizziness, history of vertigo, " light-headedness, coordination disturbances, altered mental status and loss of consciousness.   Hematologic/Lymphatic: Negative for history of bleeding disorder.   Psychiatric/Behavioral: Negative for altered mental status.       Objective:      Physical Exam   Constitutional: She is oriented to person, place, and time. Vital signs are normal. She appears well-developed and well-nourished.  Non-toxic appearance. She does not have a sickly appearance. She does not appear ill. She appears distressed.   HENT:   Head: Normocephalic and atraumatic. Head is without abrasion, without contusion and without laceration.   Right Ear: External ear normal.   Left Ear: External ear normal.   Mouth/Throat: Mucous membranes are normal.   Eyes: Pupils are equal, round, and reactive to light. Conjunctivae, EOM and lids are normal.   Neck: Trachea normal, full passive range of motion without pain and phonation normal. Neck supple.   Cardiovascular: Normal rate and intact distal pulses.   Pulses:       Dorsalis pedis pulses are 2+ on the right side.        Posterior tibial pulses are 2+ on the right side.   Pulmonary/Chest: Effort normal. No stridor. No respiratory distress.   Musculoskeletal: Normal range of motion. She exhibits tenderness.        Right foot: There is tenderness, bony tenderness and swelling.        Feet:    Neurological: She is alert and oriented to person, place, and time.   Skin: Skin is warm, dry, intact and no rash. Capillary refill takes less than 2 seconds. Lesions:  erythemaabrasion, burn, bruising and ecchymosis  Psychiatric: She has a normal mood and affect. Her speech is normal and behavior is normal. Judgment and thought content normal. Cognition and memory are normal.   Nursing note and vitals reviewed.  X-ray Foot Complete 3 View Right    Result Date: 5/20/2020  EXAMINATION: XR FOOT COMPLETE 3 VIEW RIGHT CLINICAL HISTORY: Unspecified injury of right foot, initial encounter FINDINGS: There is hammertoe  repair of the PIP joint of the 5th digit.  There is mild DJD and hallux valgus deformity.  No fracture dislocation bone destruction seen.  There is a spur on the calcaneus. Electronically signed by: Sen Kitchen MD Date:    05/20/2020 Time:    11:38      Assessment:       1. Injury of toe on right foot, initial encounter    2. Puncture wound of great toe of left foot, initial encounter        Plan:         Injury of toe on right foot, initial encounter  -     diptheria-tetanus toxoids 2-2 Lf unit/0.5 mL injection 0.5 mL  -     X-Ray Foot Complete 3 view Right; Future; Expected date: 05/20/2020  -     sulfamethoxazole-trimethoprim 800-160mg (BACTRIM DS) 800-160 mg Tab; Take 1 tablet by mouth 2 (two) times daily. for 10 days  Dispense: 20 tablet; Refill: 0    Puncture wound of great toe of left foot, initial encounter  -     diptheria-tetanus toxoids 2-2 Lf unit/0.5 mL injection 0.5 mL  -     X-Ray Foot Complete 3 view Right; Future; Expected date: 05/20/2020  -     sulfamethoxazole-trimethoprim 800-160mg (BACTRIM DS) 800-160 mg Tab; Take 1 tablet by mouth 2 (two) times daily. for 10 days  Dispense: 20 tablet; Refill: 0          Patient Instructions     Puncture Wound: Foot       A puncture wound occurs when a pointed object (such as a nail) pushes into the skin. It may go into the tissues below the skin of the foot, including fat and muscle. This type of wound is narrow and deep. They can be hard to clean. Puncture wounds are at high risk for becoming infected. One type of serious infection is more likely if you were wearing a rubber-soled shoe at the time of injury. Bacteria from the sole of the shoe may be dragged into the wound. Symptoms of infection may appear as late as 2 to 3 weeks after the injury. Be sure to watch for symptoms of infection and call your healthcare provider right away if any them appear.  X-rays may be done to see whether any objects remain under the skin. Your may also need a tetanus shot.  This is given if you are not up to date on this vaccination and the object that caused the wound may lead to tetanus.  Puncture wounds can easily become infected.   Home care  · When you sit or lie down, raise the foot above the level of your heart. This helps reduce swelling and pain.  · Do not put weight on the injured foot if it hurts to do so or if you were told to keep weight off the injury.  · Your healthcare provider may prescribe an antibiotic. This is to help prevent infection. Follow all instructions for taking this medicine. Take the medicine every day until it is gone or you are told to stop. You should not have any left over.  · The healthcare provider may prescribe medicines for pain. Follow instructions for taking them.  · You can take acetaminophen or ibuprofen for pain, unless you were given a different pain medicine to use.   · Follow the healthcare providers instructions on how to care for the wound.  · Keep the wound clean and dry. Do not get the wound wet until you are told it is okay to do so. If the area gets wet, gently pat it dry with a clean cloth. Replace the wet bandage with a dry one.  · If a bandage was applied and it becomes wet or dirty, replace it. Otherwise, leave it in place for the first 24 hours.  · Once you can get the wound wet, you may shower as usual but do not soak the wound in water (no tub baths or swimming)  · Check the wound daily for symptoms of infection. These include:  ¨ Increasing redness or swelling around the wound  ¨ Increased warmth of the wound  ¨ Worsening pain  ¨ Red streaking lines away from the wound  ¨ Draining pus  Follow-up care  Follow up with your healthcare provider as advised.   When to seek medical advice  Call your healthcare provider right away if any of these occur:  · Any symptoms of infection (listed above)  · Fever of 100.4°F (38.ºC) or higher, or as directed by your healthcare provider  · Wound changes colors  · Numbness around the  wound  · Decreased movement around the injured area  Date Last Reviewed: 8/24/2015  © 2277-5216 [employer]. [address] [place] [address], [place] [address]. All rights reserved. This information is not intended as a substitute for professional medical care. Always follow your healthcare professional's instructions.        Puncture Wound       A puncture wound occurs when a pointed object pushes into the skin. It may go into the tissues below the skin, including fat and muscle. This type of wound is narrow and deep and can be hard to clean. Because of this, puncture wounds are at high risk for becoming infected.  X-rays may be done to check the wound for objects stuck under the skin. Your may also need a tetanus shot. This is given if you are not up to date on this vaccination and the object that caused the wound may lead to tetanus.  Home care  · Your healthcare provider may prescribe an antibiotic. This is to help prevent infection. Follow all instructions for taking this medicine. Take the medicine every day until it is gone or you are told to stop. You should not have any left over.  · The healthcare provider may prescribe medicines for pain. Follow instructions for taking them.  · You can take acetaminophen or ibuprofen for pain, unless you were given a different pain medicine to use.   · Follow the healthcare providers instructions on how to care for the wound.  · Keep the wound clean and dry. Do not get the wound wet until you are told it is okay to do so. If the area gets wet, gently pat it dry with a clean cloth. Replace the wet bandage with a dry one.  · If a bandage was applied and it becomes wet or dirty, replace it. Otherwise, leave it in place for the first 24 hours.  · Once you can get the wound wet, you may shower as usual but do not soak the wound in water (no tub baths or swimming)  · Even with proper treatment, a puncture wound may become infected. Check the wound daily for signs of  infection listed below.  Follow-up care  Follow up with your healthcare provider as advised.   When to seek medical advice  Call your healthcare provider right away if any of these occur:  · Signs of infection, including:  ¨ Increasing redness or swelling around the wound  ¨ Increased warmth of the wound  ¨ Worsening pain  ¨ Red streaking lines away from the wound  ¨ Draining pus  · Fever of 100.4°F (38.ºC) or higher or as directed by your healthcare provider  · Wound changes colors  · Numbness around the wound  · Decreased movement around the injured area  Date Last Reviewed: 8/24/2015  © 9083-0378 [email or web address]. [address] [place] [address], [place] [address]. All rights reserved. This information is not intended as a substitute for professional medical care. Always follow your healthcare professional's instructions.

## 2020-11-02 ENCOUNTER — TELEPHONE (OUTPATIENT)
Dept: ORTHOPEDICS | Facility: CLINIC | Age: 75
End: 2020-11-02

## 2020-11-02 NOTE — TELEPHONE ENCOUNTER
Left a voice mail for pt to return my call regarding an appointment that was scheduled for tomorrow with Dr Bocanegra for back/spine/neck

## 2020-11-02 NOTE — TELEPHONE ENCOUNTER
----- Message from Fareed Pettit sent at 11/2/2020  3:51 PM CST -----  Contact: pt  Please call pt at [phone]    Patient is returning staff call regarding her upcoming appt     Thank you

## 2020-11-03 ENCOUNTER — HOSPITAL ENCOUNTER (OUTPATIENT)
Dept: RADIOLOGY | Facility: HOSPITAL | Age: 75
Discharge: HOME | End: 2020-11-03
Attending: ORTHOPAEDIC SURGERY
Payer: MEDICARE

## 2020-11-03 ENCOUNTER — OFFICE VISIT (OUTPATIENT)
Dept: ORTHOPEDICS | Facility: CLINIC | Age: 75
End: 2020-11-03
Payer: MEDICARE

## 2020-11-03 VITALS — HEIGHT: 63 IN | WEIGHT: 113.56 LBS | BODY MASS INDEX: 20.12 KG/M2

## 2020-11-03 DIAGNOSIS — M25.561 RIGHT KNEE PAIN, UNSPECIFIED CHRONICITY: ICD-10-CM

## 2020-11-03 DIAGNOSIS — M25.561 RIGHT KNEE PAIN, UNSPECIFIED CHRONICITY: Primary | ICD-10-CM

## 2020-11-03 DIAGNOSIS — M54.9 DORSALGIA, UNSPECIFIED: ICD-10-CM

## 2020-11-03 DIAGNOSIS — M54.31 SCIATICA OF RIGHT SIDE: Primary | ICD-10-CM

## 2020-11-03 PROCEDURE — 72110 X-RAY EXAM L-2 SPINE 4/>VWS: CPT | Mod: 26,,, | Performed by: RADIOLOGY

## 2020-11-03 PROCEDURE — 99203 OFFICE O/P NEW LOW 30 MIN: CPT | Mod: S$PBB,,, | Performed by: ORTHOPAEDIC SURGERY

## 2020-11-03 PROCEDURE — 73562 X-RAY EXAM OF KNEE 3: CPT | Mod: 26,RT,, | Performed by: RADIOLOGY

## 2020-11-03 PROCEDURE — 99203 PR OFFICE/OUTPT VISIT, NEW, LEVL III, 30-44 MIN: ICD-10-PCS | Mod: S$PBB,,, | Performed by: ORTHOPAEDIC SURGERY

## 2020-11-03 PROCEDURE — 73560 XR KNEE ORTHO RIGHT: ICD-10-PCS | Mod: 26,59,LT, | Performed by: RADIOLOGY

## 2020-11-03 PROCEDURE — 99999 PR PBB SHADOW E&M-EST. PATIENT-LVL III: CPT | Mod: PBBFAC,,, | Performed by: ORTHOPAEDIC SURGERY

## 2020-11-03 PROCEDURE — 73560 X-RAY EXAM OF KNEE 1 OR 2: CPT | Mod: TC,LT,59

## 2020-11-03 PROCEDURE — 73562 XR KNEE ORTHO RIGHT: ICD-10-PCS | Mod: 26,RT,, | Performed by: RADIOLOGY

## 2020-11-03 PROCEDURE — 99999 PR PBB SHADOW E&M-EST. PATIENT-LVL III: ICD-10-PCS | Mod: PBBFAC,,, | Performed by: ORTHOPAEDIC SURGERY

## 2020-11-03 PROCEDURE — 72110 X-RAY EXAM L-2 SPINE 4/>VWS: CPT | Mod: TC

## 2020-11-03 PROCEDURE — 99213 OFFICE O/P EST LOW 20 MIN: CPT | Mod: PBBFAC,25 | Performed by: ORTHOPAEDIC SURGERY

## 2020-11-03 PROCEDURE — 73560 X-RAY EXAM OF KNEE 1 OR 2: CPT | Mod: 26,59,LT, | Performed by: RADIOLOGY

## 2020-11-03 PROCEDURE — 73562 X-RAY EXAM OF KNEE 3: CPT | Mod: TC,RT

## 2020-11-03 PROCEDURE — 72110 XR LUMBAR SPINE 5 VIEW WITH FLEX AND EXT: ICD-10-PCS | Mod: 26,,, | Performed by: RADIOLOGY

## 2020-11-03 RX ORDER — MELOXICAM 15 MG/1
15 TABLET ORAL DAILY
COMMUNITY
End: 2024-03-18

## 2020-11-06 PROBLEM — M54.31 SCIATICA OF RIGHT SIDE: Status: ACTIVE | Noted: 2020-11-06

## 2020-11-06 NOTE — PROGRESS NOTES
Subjective:       Patient ID: Chantal Snowden is a 74 y.o. female.    Chief Complaint:   Pain of the Right Knee   She comes in ostensibly for pain in the right knee, but she has pain going down the entire right lower extremity.  She has a history of back surgery 10 years ago for sciatica.  She does have numbness and tingling intermittently in the foot and ankle area.  She does have night pain interfering with sleep.  She tried taking meloxicam but it made her sleepy.  No fall, accident, injury.  No groin pain.    History reviewed. No pertinent past medical history.  Past Surgical History:   Procedure Laterality Date    BACK SURGERY      10 years ago    hx of wrist injection Left 04/2017    SHOULDER SURGERY Left     20 to 25 years ago    wrist sugery Right     20 years ago     History reviewed. No pertinent family history.  Social History     Socioeconomic History    Marital status:      Spouse name: Not on file    Number of children: Not on file    Years of education: Not on file    Highest education level: Not on file   Occupational History    Not on file   Social Needs    Financial resource strain: Not on file    Food insecurity     Worry: Not on file     Inability: Not on file    Transportation needs     Medical: Not on file     Non-medical: Not on file   Tobacco Use    Smoking status: Never Smoker    Smokeless tobacco: Never Used   Substance and Sexual Activity    Alcohol use: Yes     Alcohol/week: 12.0 standard drinks     Types: 10 Glasses of wine, 2 Shots of liquor per week     Comment: soc    Drug use: No    Sexual activity: Not on file   Lifestyle    Physical activity     Days per week: Not on file     Minutes per session: Not on file    Stress: Not on file   Relationships    Social connections     Talks on phone: Not on file     Gets together: Not on file     Attends [employer] service: Not on file     Active member of club or organization: Not on file     Attends meetings  "of clubs or organizations: Not on file     Relationship status: Not on file   Other Topics Concern    Not on file   Social History Narrative    Not on file       Current Outpatient Medications   Medication Sig Dispense Refill    meloxicam (MOBIC) 15 MG tablet Take 15 mg by mouth once daily.      omeprazole (PRILOSEC) 20 MG capsule TAKE 1 CAPSULE BY MOUTH EVERY DAY BEFORE A MEAL  3    pravastatin (PRAVACHOL) 40 MG tablet Take 40 mg by mouth once daily.      atorvastatin (LIPITOR) 20 MG tablet Take 20 mg by mouth once daily.  3    estrogen, conjugated,-medroxyprogesterone 0.3-1.5 mg (PREMPRO) 0.3-1.5 mg per tablet Take 1 tablet by mouth once daily.      lorazepam (ATIVAN) 1 MG tablet 1 mg. Pt states takes half tablet every night      UNABLE TO FIND TURMERIC FORCE,  Pt states is a capsule she takes daily, not sure of MG. Pt states takes it for inflammation.       No current facility-administered medications for this visit.      Review of patient's allergies indicates:  No Known Allergies    ROS:  A review of systems is updated and there are no reported vision changes, ear/nose/mouth/throat complaints,  chest pain, shortness of breath, abdominal pain, urological complaints, fevers or chills, psychiatric complaints. Musculoskeletal and neurologcial symptoms are as documented. All other systems are negative.    Objective:      Vitals:    11/03/20 1139   Weight: 51.5 kg (113 lb 8.6 oz)   Height: 5' 3" (1.6 m)     Physical Exam  On exam, she has a positive flip test with a pulling sensation in the back of her leg and onset of paresthesias distally.  No groin pain with passive flexion and internal rotation of the hip.  Right knee range of motion is 0-115 degrees.  Right knee nontender without synovitis or effusion.  No instability to varus/valgus/Lachman's stress.  No fixed sensory deficit.  Diffuse tenderness in the paraspinous muscles of lumbar spine and right upper buttock area.  Imaging Review:   Weightbearing " x-rays of the right knee done today show moderate tricompartmental arthrosis.  AP and lateral views of the lumbar spine show prominent T12-L1 and L5-S1 degenerative disc disease, with disc height loss and endplate changes.  Assessment:   Right sciatica secondary to lumbar degenerative disc disease  Plan:       We will get her a [facility], as I think this is more appropriate for her pain generator.  She is in agreement with this, and agrees that her problem is probably spine related.  The patient's pathophysiology was explained in detail with reference to x-rays, models, other visual aids as appropriate.  Treatment options were discussed in detail.  Questions were invited and answered to the patient's satisfaction.

## 2020-11-30 ENCOUNTER — OFFICE VISIT (OUTPATIENT)
Dept: ORTHOPEDICS | Facility: CLINIC | Age: 75
End: 2020-11-30
Payer: MEDICARE

## 2020-11-30 VITALS — WEIGHT: 113.56 LBS | HEIGHT: 63 IN | BODY MASS INDEX: 20.12 KG/M2

## 2020-11-30 DIAGNOSIS — M54.31 SCIATICA OF RIGHT SIDE: ICD-10-CM

## 2020-11-30 PROCEDURE — 99999 PR PBB SHADOW E&M-EST. PATIENT-LVL III: CPT | Mod: PBBFAC,,, | Performed by: ORTHOPAEDIC SURGERY

## 2020-11-30 PROCEDURE — 99214 PR OFFICE/OUTPT VISIT, EST, LEVL IV, 30-39 MIN: ICD-10-PCS | Mod: S$PBB,,, | Performed by: ORTHOPAEDIC SURGERY

## 2020-11-30 PROCEDURE — 99213 OFFICE O/P EST LOW 20 MIN: CPT | Mod: PBBFAC | Performed by: ORTHOPAEDIC SURGERY

## 2020-11-30 PROCEDURE — 99214 OFFICE O/P EST MOD 30 MIN: CPT | Mod: S$PBB,,, | Performed by: ORTHOPAEDIC SURGERY

## 2020-11-30 PROCEDURE — 99999 PR PBB SHADOW E&M-EST. PATIENT-LVL III: ICD-10-PCS | Mod: PBBFAC,,, | Performed by: ORTHOPAEDIC SURGERY

## 2020-11-30 RX ORDER — ESCITALOPRAM OXALATE 10 MG/1
10 TABLET ORAL DAILY
COMMUNITY
End: 2024-02-20

## 2020-11-30 RX ORDER — GABAPENTIN 100 MG/1
100 CAPSULE ORAL 3 TIMES DAILY
Qty: 90 CAPSULE | Refills: 11 | Status: SHIPPED | OUTPATIENT
Start: 2020-11-30 | End: 2024-02-20

## 2020-11-30 RX ORDER — TRAZODONE HYDROCHLORIDE 50 MG/1
50 TABLET ORAL NIGHTLY
COMMUNITY

## 2020-11-30 NOTE — LETTER
November 30, 2020      Leonel Gaspar MD  [address]  [facility] [address]           [id number]  [address]  [facility] [address]  Phone: [phone]          Patient: Chantal Snowden   MR Number: [id number]   YOB: 1945   Date of Visit: 11/30/2020       Dear Dr. Leonel Gaspar:    Thank you for referring Chantal Snowden to me for evaluation. Attached you will find relevant portions of my assessment and plan of care.    If you have questions, please do not hesitate to call me. I look forward to following Chantal Snowden along with you.    Sincerely,    Yvonne Garrett PA-C    Enclosure  CC:  No Recipients    If you would like to receive this communication electronically, please contact [email or web address] or [phone] to request more information on [employer] access.    For providers and/or their staff who would like to refer a patient to [facility], please contact us through our one-stop-shop provider referral line, [facility], at [phone].    If you feel you have received this communication in error or would no longer like to receive these types of communications, please e-mail [email or web address]

## 2020-11-30 NOTE — PROGRESS NOTES
DATE: 11/30/2020  PATIENT: Chantal Snowden    Supervising Physician: Keron Fierro M.D.    CHIEF COMPLAINT: R leg pain    HISTORY:  Chantal Snowden is a 75 y.o. female s/p L5-S1 laminectomy and discectomy (self reported, 15 years ago) here for initial evaluation of low back and R leg pain (Back - 2, Leg - 6).  The pain in the R leg is what bothers her most.  The pain has been present for a few months. The patient describes the pain as shooting down the back of her R leg. The pain is worse at night when laying down and with exercise. Pt does Crossfit regularly and says heavy weight lifting will sometimes aggravate her pain. There is positive associated numbness and tingling. There is negative subjective weakness. Pt says 15 years ago she was having severe R leg pain with burning, numbness, and tingling. She tried PT and one VINAY before having an L5-S1 laminectomy and discectomy. Pt says she did very well after surgery and her pain was 0/10 for many years. She currently takes Mobic PRN for knee pain, which does not help her leg pain.    The patient denies myelopathic symptoms such as handwriting changes or difficulty with buttons/coins/keys. Denies perineal paresthesias, bowel/bladder dysfunction.    PAST MEDICAL/SURGICAL HISTORY:  No past medical history on file.  Past Surgical History:   Procedure Laterality Date    BACK SURGERY      10 years ago    hx of wrist injection Left 04/2017    SHOULDER SURGERY Left     20 to 25 years ago    wrist sugery Right     20 years ago       Medications:   Current Outpatient Medications on File Prior to Visit   Medication Sig Dispense Refill    atorvastatin (LIPITOR) 20 MG tablet Take 20 mg by mouth once daily.  3    escitalopram oxalate (LEXAPRO) 10 MG tablet Take 10 mg by mouth once daily.      estrogen, conjugated,-medroxyprogesterone 0.3-1.5 mg (PREMPRO) 0.3-1.5 mg per tablet Take 1 tablet by mouth once daily.      lorazepam (ATIVAN) 1 MG tablet 1 mg. Pt  states takes half tablet every night      meloxicam (MOBIC) 15 MG tablet Take 15 mg by mouth once daily.      omeprazole (PRILOSEC) 20 MG capsule TAKE 1 CAPSULE BY MOUTH EVERY DAY BEFORE A MEAL  3    pravastatin (PRAVACHOL) 40 MG tablet Take 40 mg by mouth once daily.      traZODone (DESYREL) 50 MG tablet Take 50 mg by mouth every evening.      UNABLE TO FIND TURMERIC FORCE,  Pt states is a capsule she takes daily, not sure of MG. Pt states takes it for inflammation.       No current facility-administered medications on file prior to visit.        Social History:   Social History     Socioeconomic History    Marital status:      Spouse name: Not on file    Number of children: Not on file    Years of education: Not on file    Highest education level: Not on file   Occupational History    Not on file   Social Needs    Financial resource strain: Not on file    Food insecurity     Worry: Not on file     Inability: Not on file    Transportation needs     Medical: Not on file     Non-medical: Not on file   Tobacco Use    Smoking status: Never Smoker    Smokeless tobacco: Never Used   Substance and Sexual Activity    Alcohol use: Yes     Alcohol/week: 12.0 standard drinks     Types: 10 Glasses of wine, 2 Shots of liquor per week     Comment: soc    Drug use: No    Sexual activity: Not on file   Lifestyle    Physical activity     Days per week: Not on file     Minutes per session: Not on file    Stress: Not on file   Relationships    Social connections     Talks on phone: Not on file     Gets together: Not on file     Attends [employer] service: Not on file     Active member of club or organization: Not on file     Attends meetings of clubs or organizations: Not on file     Relationship status: Not on file   Other Topics Concern    Not on file   Social History Narrative    Not on file       REVIEW OF SYSTEMS:  Constitution: Negative. Negative for chills, fever and night sweats.   Cardiovascular:  "Negative for chest pain and syncope.   Respiratory: Negative for cough and shortness of breath.   Gastrointestinal: See HPI. Negative for nausea/vomiting. Negative for abdominal pain.  Genitourinary: See HPI. Negative for discoloration or dysuria.  Skin: Negative for dry skin, itching and rash.   Hematologic/Lymphatic: Negative for bleeding problem. Does not bruise/bleed easily.   Musculoskeletal: Negative for falls and muscle weakness.   Neurological: See HPI. No seizures.   Endocrine: Negative for polydipsia, polyphagia and polyuria.   Allergic/Immunologic: Negative for hives and persistent infections.     EXAM:  Ht 5' 3" (1.6 m)   Wt 51.5 kg (113 lb 8.6 oz)   BMI 20.11 kg/m²     General: The patient is a very pleasant 75 y.o. female in no apparent distress, the patient is oriented to person, place and time.  Psych: Normal mood and affect  HEENT: Vision grossly intact, hearing intact to the spoken word.  Lungs: Respirations unlabored.  Gait: Normal station and gait, no difficulty with toe or heel walk.   Skin: Dorsal lumbar skin negative for rashes, lesions, hairy patches. 1 inch midline lumbosacral surgical scar noted, well healed with no signs of infection. There is negative lumbar tenderness to palpation.  Range of motion: Lumbar range of motion is acceptable.  Spinal Balance: Global saggital and coronal spinal balance acceptable, not significant for scoliosis and kyphosis.  Musculoskeletal: No pain with the range of motion of the bilateral hips. No trochanteric tenderness to palpation.  Vascular: Bilateral lower extremities warm and well perfused, dorsalis pedis pulses 2+ bilaterally.  Neurological: Normal strength and tone in all major motor groups in the bilateral lower extremities. Normal sensation to light touch in the L2-S1 dermatomes bilaterally.  Deep tendon reflexes symmetric 3+ in the bilateral lower extremities.  Negative Babinski bilaterally. Straight leg raise negative bilaterally.    IMAGING:    "   Today I personally reviewed AP, Lat and Flex/Ex  upright L-spine films that demonstrate    retrolisthesis of T12 on L1 and L1 on L2. Significant disc space narrowing at L5-S1.    Body mass index is 20.11 kg/m².    No results found for: HGBA1C        ASSESSMENT/PLAN:    Diagnoses and all orders for this visit:    Sciatica of right side  -     Ambulatory referral[facility]ult [facility]    Other orders  -     gabapentin (NEURONTIN) 100 MG capsule; Take 1 capsule (100 mg total) by mouth 3 (three) times daily.        Today we discussed at length all of the different treatment options including anti-inflammatories, acetaminophen, rest, ice, heat, physical therapy including strengthening and stretching exercises, home exercises, ROM, aerobic conditioning, aqua therapy, other modalities including ultrasound, massage, and dry needling, epidural steroid injections and finally surgical intervention.      Sent prescription for gabapentin to pt's pharmacy, she will take 1 pill at night and can take up to 3 a day as needed. She is not interested in formal PT at this time. Pt will f/u in clinic as needed if pain does not subside and we will order thoracolumbar MRI.

## 2024-02-07 ENCOUNTER — TELEPHONE (OUTPATIENT)
Dept: CARDIOLOGY | Facility: CLINIC | Age: 79
End: 2024-02-07
Payer: MEDICARE

## 2024-02-07 NOTE — TELEPHONE ENCOUNTER
----- Message from Broderick Watson sent at 2/7/2024  8:27 AM CST -----  Regarding: Appointment  Name of Who is Calling:  Patient          What is the request in detail:  Patient state she live between [place] and [place] and would like to know can she be seen earlier that 03/18/2024            Can the clinic reply by [facility]: No            What Number to Call Back if not in [facility]: [phone]

## 2024-02-20 ENCOUNTER — OFFICE VISIT (OUTPATIENT)
Dept: CARDIOLOGY | Facility: CLINIC | Age: 79
End: 2024-02-20
Attending: INTERNAL MEDICINE
Payer: MEDICARE

## 2024-02-20 VITALS
HEIGHT: 63 IN | WEIGHT: 117.75 LBS | BODY MASS INDEX: 20.86 KG/M2 | DIASTOLIC BLOOD PRESSURE: 96 MMHG | HEART RATE: 66 BPM | SYSTOLIC BLOOD PRESSURE: 148 MMHG

## 2024-02-20 DIAGNOSIS — I25.10 CORONARY ARTERY DISEASE INVOLVING NATIVE CORONARY ARTERY OF NATIVE HEART WITHOUT ANGINA PECTORIS: ICD-10-CM

## 2024-02-20 DIAGNOSIS — Z86.73 HISTORY OF TRANSIENT ISCHEMIC ATTACK: ICD-10-CM

## 2024-02-20 DIAGNOSIS — E78.00 HYPERCHOLESTEROLEMIA: ICD-10-CM

## 2024-02-20 DIAGNOSIS — Z13.6 ENCOUNTER FOR SCREENING FOR CARDIOVASCULAR DISORDERS: ICD-10-CM

## 2024-02-20 DIAGNOSIS — Z82.49 FAMILY HISTORY OF ISCHEMIC HEART DISEASE: ICD-10-CM

## 2024-02-20 PROCEDURE — 99205 OFFICE O/P NEW HI 60 MIN: CPT | Mod: S$PBB,,, | Performed by: INTERNAL MEDICINE

## 2024-02-20 PROCEDURE — 93010 ELECTROCARDIOGRAM REPORT: CPT | Mod: ,,, | Performed by: INTERNAL MEDICINE

## 2024-02-20 PROCEDURE — 93005 ELECTROCARDIOGRAM TRACING: CPT

## 2024-02-20 PROCEDURE — 99213 OFFICE O/P EST LOW 20 MIN: CPT | Mod: PBBFAC,25 | Performed by: INTERNAL MEDICINE

## 2024-02-20 PROCEDURE — 99999 PR PBB SHADOW E&M-EST. PATIENT-LVL III: CPT | Mod: PBBFAC,,, | Performed by: INTERNAL MEDICINE

## 2024-02-20 NOTE — PROGRESS NOTES
Subjective:     Chantal Snowden is a 78 y.o. female with hypercholesterolemia. She has a healthy weight. Vascular disease runs in her family. In 2021 she had a transient ischemic attack that affected her speech. She tells me two spots were seen on her magnetic resonance imaging test. No sequela. No exertional chest pain or shortness of breath. No palpitations or weak spells. No issues with any of her prescribed medications. Comes in for evaluation.        Cerebrovascular Accident  This is a chronic problem. The current episode started more than 1 year ago. The problem occurs constantly. The problem has been unchanged. Pertinent negatives include no abdominal pain, anorexia, arthralgias, change in bowel habit, chest pain, chills, congestion, coughing, diaphoresis, fatigue, fever, headaches, joint swelling, myalgias, nausea, neck pain, numbness, rash, sore throat, swollen glands, urinary symptoms, vertigo, visual change, vomiting or weakness.   Hyperlipidemia  She has no history of chronic renal disease, diabetes, hypothyroidism, liver disease, obesity or nephrotic syndrome. Pertinent negatives include no chest pain, focal sensory loss, focal weakness, leg pain, myalgias or shortness of breath.       Review of Systems   Constitutional: Negative for chills, diaphoresis, fatigue, fever and malaise/fatigue.   HENT:  Negative for congestion, nosebleeds, sore throat and tinnitus.    Eyes:  Negative for double vision, vision loss in left eye and vision loss in right eye.   Cardiovascular:  Negative for chest pain, claudication, dyspnea on exertion, irregular heartbeat, leg swelling, near-syncope, orthopnea, palpitations, paroxysmal nocturnal dyspnea and syncope.   Respiratory:  Negative for cough, hemoptysis, shortness of breath and wheezing.    Endocrine: Negative for cold intolerance and heat intolerance.   Hematologic/Lymphatic: Negative for bleeding problem. Does not bruise/bleed easily.   Skin:  Negative for  color change and rash.   Musculoskeletal:  Negative for arthralgias, back pain, falls, joint swelling, muscle weakness, myalgias and neck pain.   Gastrointestinal:  Negative for abdominal pain, anorexia, change in bowel habit, diarrhea, dysphagia, heartburn, hematemesis, hematochezia, hemorrhoids, jaundice, melena, nausea and vomiting.   Genitourinary:  Negative for dysuria and hematuria.   Neurological:  Negative for dizziness, focal weakness, headaches, light-headedness, loss of balance, numbness, tremors, vertigo and weakness.   Psychiatric/Behavioral:  Positive for memory loss. Negative for altered mental status and depression. The patient is not nervous/anxious.    Allergic/Immunologic: Negative for hives and persistent infections.       Current Outpatient Medications on File Prior to Visit   Medication Sig Dispense Refill    atorvastatin (LIPITOR) 20 MG tablet Take 20 mg by mouth once daily.  3    estrogen, conjugated,-medroxyprogesterone 0.3-1.5 mg (PREMPRO) 0.3-1.5 mg per tablet Take 1 tablet by mouth once daily. Every 2-3 days      gabapentin (NEURONTIN) 100 MG capsule Take 1 capsule (100 mg total) by mouth 3 (three) times daily. (Patient taking differently: Take 100 mg by mouth 3 (three) times daily. Twice a month for sleep) 90 capsule 11    lorazepam (ATIVAN) 1 MG tablet 1 mg. Pt states takes half tablet every night      meloxicam (MOBIC) 15 MG tablet Take 15 mg by mouth once daily.      omeprazole (PRILOSEC) 20 MG capsule TAKE 1 CAPSULE BY MOUTH EVERY DAY BEFORE A MEAL  3    pravastatin (PRAVACHOL) 40 MG tablet Take 40 mg by mouth once daily.      traZODone (DESYREL) 50 MG tablet Take 50 mg by mouth every evening. rarely      UNABLE TO FIND TURMERIC FORCE,  Pt states is a capsule she takes daily, not sure of MG. Pt states takes it for inflammation.      [DISCONTINUED] escitalopram oxalate (LEXAPRO) 10 MG tablet Take 10 mg by mouth once daily.       No current facility-administered medications on file  "prior to visit.        BP (!) 148/96   Pulse 66   Ht 5' 3" (1.6 m)   Wt 53.4 kg (117 lb 11.6 oz)   BMI 20.85 kg/m²     Objective:     Physical Exam  Constitutional:       General: She is not in acute distress.     Appearance: Normal appearance. She is well-developed. She is not toxic-appearing or diaphoretic.   HENT:      Head: Normocephalic and atraumatic.      Nose: Nose normal.   Eyes:      General:         Right eye: No discharge.         Left eye: No discharge.      Conjunctiva/sclera:      Right eye: Right conjunctiva is not injected.      Left eye: Left conjunctiva is not injected.      Pupils: Pupils are equal.      Right eye: Pupil is round.      Left eye: Pupil is round.   Neck:      Thyroid: No thyromegaly.      Vascular: No carotid bruit or JVD.   Cardiovascular:      Rate and Rhythm: Normal rate and regular rhythm. No extrasystoles are present.     Chest Wall: PMI is not displaced.      Pulses:           Radial pulses are 2+ on the right side and 2+ on the left side.        Femoral pulses are 2+ on the right side and 2+ on the left side.       Dorsalis pedis pulses are 2+ on the right side and 2+ on the left side.        Posterior tibial pulses are 2+ on the right side and 2+ on the left side.      Heart sounds: S1 normal and S2 normal. No murmur heard.     Gallop present. S4 sounds present.   Pulmonary:      Effort: Pulmonary effort is normal.      Breath sounds: Normal breath sounds.   Abdominal:      Palpations: Abdomen is soft.      Tenderness: There is no abdominal tenderness.   Musculoskeletal:      Cervical back: Neck supple.      Right lower leg: Normal. No swelling. No edema.      Left lower leg: Normal. No swelling. No edema.   Lymphadenopathy:      Head:      Right side of head: No submandibular adenopathy.      Left side of head: No submandibular adenopathy.      Cervical: No cervical adenopathy.   Skin:     General: Skin is warm and dry.      Findings: No rash.      Nails: There is no " "clubbing.   Neurological:      General: No focal deficit present.      Mental Status: She is alert and oriented to person, place, and time. She is not disoriented.      Cranial Nerves: No cranial nerve deficit.   Psychiatric:         Attention and Perception: Attention and perception normal.         Mood and Affect: Mood and affect normal.         Speech: Speech normal.         Behavior: Behavior normal.         Thought Content: Thought content normal.         Cognition and Memory: Cognition and memory normal.         Judgment: Judgment normal.       Assessment:      1. History of transient ischemic attack    2. Hypercholesterolemia    3. Family history of ischemic heart disease        Plan:     History of Transient Ischemic Attack   2021: TIA. Speech affected 15-20 min. "Two spots on MRI".   11/17/2021: [facility]: MRI: Moderate involutional changes of brain. Mild supratentorial white matter changes, while nonspecific, most commonly sequela of chronic microvascular ischemia.   11/17/2021: Carotid Duplex: Mild plaquing.   She used to be on aspirin 81 mg Q24.   She came off aspirin due to easy bruising.  2/20/2024: Advised to resume aspirin 81 mg Q24.  Discussed risk vs benefit with estrogen replacement.     2. High Blood Pressure   2/20/2024: 148/96 mmHg in office.   Says she has never been told her pressure has been elevated.   Obtain cuff and keep log.    3. Hypercholesterolemia   2000: Statin was begun.   On pravastatin 40 mg Q24.   2/20/2024: Do lipid panel, CBC and CMP.    4. Family History for Ischemic Heart Disease   Mother PTCA in her 50's.  Father MI in 50's.  2/20/2024: Do calcium score.    5. Poor Memory   11/17/2021: [facility]: MRI: Moderate involutional changes of brain.   Appears moderate.    6. Primary Care   Dr. Luiz Hutchison.    F/u 6 weeks.    Jared Amador M.D.        2/26/2024 1:01 PM, Addendum:    2/23/2024: Calcium score 1,349.    2/23/2024: Chol 186. HDL 80. LDL 87. TG 95.    2/26/2024: Pravastatin 40 " mg Q24 to be changed to rosuvastatin 20 mg Q24 with very high calcium score. In 4 weeks do lipid panel.    I discussed the above test result and the implications of the findings over the phone by leaving message.    Jared Amador M.D.

## 2024-02-22 LAB
OHS QRS DURATION: 70 MS
OHS QTC CALCULATION: 426 MS

## 2024-02-23 ENCOUNTER — HOSPITAL ENCOUNTER (OUTPATIENT)
Dept: RADIOLOGY | Facility: OTHER | Age: 79
Discharge: HOME | End: 2024-02-23
Attending: INTERNAL MEDICINE
Payer: MEDICARE

## 2024-02-23 DIAGNOSIS — Z13.6 ENCOUNTER FOR SCREENING FOR CARDIOVASCULAR DISORDERS: ICD-10-CM

## 2024-02-23 PROCEDURE — 75571 CT HRT W/O DYE W/CA TEST: CPT | Mod: 26,,, | Performed by: RADIOLOGY

## 2024-02-23 PROCEDURE — 75571 CT HRT W/O DYE W/CA TEST: CPT | Mod: TC

## 2024-02-26 ENCOUNTER — TELEPHONE (OUTPATIENT)
Dept: CARDIOLOGY | Facility: CLINIC | Age: 79
End: 2024-02-26
Payer: MEDICARE

## 2024-02-26 PROBLEM — I25.10 CORONARY ARTERY DISEASE: Status: ACTIVE | Noted: 2024-02-26

## 2024-02-26 RX ORDER — ROSUVASTATIN CALCIUM 20 MG/1
20 TABLET, COATED ORAL DAILY
Qty: 90 TABLET | Refills: 3 | Status: SHIPPED | OUTPATIENT
Start: 2024-02-26

## 2024-02-26 NOTE — TELEPHONE ENCOUNTER
Pt notified      ----- Message from Jared Amador MD sent at 2/23/2024  8:42 PM CST -----  Favorable lipid panel.    Stay on current regimen.    Jared Amador M.D.

## 2024-02-26 NOTE — TELEPHONE ENCOUNTER
Pt advised to check labs in one month.    ----- Message from Jared Amador MD sent at 2/26/2024  1:12 PM CST -----  I discussed the above test result and the implications of the findings over the phone.    Please see addendum to progress note.    Jared Amador M.D.

## 2024-03-01 ENCOUNTER — TELEPHONE (OUTPATIENT)
Dept: CARDIOLOGY | Facility: CLINIC | Age: 79
End: 2024-03-01
Payer: MEDICARE

## 2024-03-01 NOTE — TELEPHONE ENCOUNTER
----- Message from Ese Colmenares MA sent at 3/1/2024 12:17 PM CST -----  Name of Who is Calling:DONALD AYALA [[id number]]                What is the request in detail: Pt is requesting a call back, she does not know what she needs to have scheduled. Please assist.                 Can the clinic reply by [facility]: No                [facility]: [phone]

## 2024-03-18 ENCOUNTER — HOSPITAL ENCOUNTER (EMERGENCY)
Facility: OTHER | Age: 79
Discharge: HOME | End: 2024-03-18
Attending: EMERGENCY MEDICINE
Payer: MEDICARE

## 2024-03-18 ENCOUNTER — TELEPHONE (OUTPATIENT)
Dept: CARDIOLOGY | Facility: CLINIC | Age: 79
End: 2024-03-18
Payer: MEDICARE

## 2024-03-18 VITALS
RESPIRATION RATE: 16 BRPM | HEIGHT: 63 IN | TEMPERATURE: 98 F | HEART RATE: 81 BPM | SYSTOLIC BLOOD PRESSURE: 192 MMHG | WEIGHT: 120 LBS | BODY MASS INDEX: 21.26 KG/M2 | DIASTOLIC BLOOD PRESSURE: 89 MMHG | OXYGEN SATURATION: 99 %

## 2024-03-18 DIAGNOSIS — M25.511 RIGHT SHOULDER PAIN: ICD-10-CM

## 2024-03-18 DIAGNOSIS — M25.519 SHOULDER PAIN: ICD-10-CM

## 2024-03-18 DIAGNOSIS — S46.911A SHOULDER STRAIN, RIGHT, INITIAL ENCOUNTER: Primary | ICD-10-CM

## 2024-03-18 LAB
OHS QRS DURATION: 64 MS
OHS QTC CALCULATION: 442 MS

## 2024-03-18 PROCEDURE — 93010 ELECTROCARDIOGRAM REPORT: CPT | Mod: ,,, | Performed by: INTERNAL MEDICINE

## 2024-03-18 PROCEDURE — 93005 ELECTROCARDIOGRAM TRACING: CPT

## 2024-03-18 PROCEDURE — 63600175 PHARM REV CODE 636 W HCPCS: Performed by: EMERGENCY MEDICINE

## 2024-03-18 PROCEDURE — 25000003 PHARM REV CODE 250: Performed by: EMERGENCY MEDICINE

## 2024-03-18 PROCEDURE — 96372 THER/PROPH/DIAG INJ SC/IM: CPT | Performed by: EMERGENCY MEDICINE

## 2024-03-18 PROCEDURE — 99285 EMERGENCY DEPT VISIT HI MDM: CPT | Mod: 25

## 2024-03-18 RX ORDER — KETOROLAC TROMETHAMINE 30 MG/ML
10 INJECTION, SOLUTION INTRAMUSCULAR; INTRAVENOUS
Status: COMPLETED | OUTPATIENT
Start: 2024-03-18 | End: 2024-03-18

## 2024-03-18 RX ORDER — HYDROCODONE BITARTRATE AND ACETAMINOPHEN 5; 325 MG/1; MG/1
1 TABLET ORAL EVERY 4 HOURS PRN
Qty: 12 TABLET | Refills: 0 | Status: SHIPPED | OUTPATIENT
Start: 2024-03-18

## 2024-03-18 RX ORDER — HYDROCODONE BITARTRATE AND ACETAMINOPHEN 5; 325 MG/1; MG/1
1 TABLET ORAL
Status: COMPLETED | OUTPATIENT
Start: 2024-03-18 | End: 2024-03-18

## 2024-03-18 RX ADMIN — HYDROCODONE BITARTRATE AND ACETAMINOPHEN 1 TABLET: 5; 325 TABLET ORAL at 02:03

## 2024-03-18 RX ADMIN — HYDROCODONE BITARTRATE AND ACETAMINOPHEN 1 TABLET: 5; 325 TABLET ORAL at 01:03

## 2024-03-18 RX ADMIN — KETOROLAC TROMETHAMINE 10 MG: 30 INJECTION, SOLUTION INTRAMUSCULAR; INTRAVENOUS at 01:03

## 2024-03-18 NOTE — TELEPHONE ENCOUNTER
Left message on voice mail      ----- Message from Loida Reyes sent at 3/18/2024 10:10 AM CDT -----  Regarding: call back request  Name of caller: DONALD       What is the requesting detail: pt is requesting a call back from paul. Please advise      Can the clinic reply by [facility]:       What number to call back: [phone]

## 2024-03-18 NOTE — FIRST PROVIDER EVALUATION
Emergency Department TeleTriage Encounter Note      CHIEF COMPLAINT    Chief Complaint   Patient presents with    Neck Pain     R side neck pain radiating to upper back and down to elbow.        VITAL SIGNS   Initial Vitals [03/18/24 1146]   BP Pulse Resp Temp SpO2   (!) 220/111 81 16 97.5 °F (36.4 °C) 99 %      MAP       --            ALLERGIES    Review of patient's allergies indicates:  No Known Allergies    PROVIDER TRIAGE NOTE  Patient presents with complaint of right-sided shoulder pain that is worse with movement.  Reports started last night.  Denies injury.  Reports history of high blood pressure for which she does not take medicines.      Phy:   Constitutional: well nourished, well developed, appearing stated age, NAD        Initial orders will be placed and care will be transferred to an alternate provider when patient is roomed for a full evaluation. Any additional orders and the final disposition will be determined by that provider.        ORDERS  Labs Reviewed - No data to display    ED Orders (720h ago, onward)      None              Virtual Visit Note: The provider triage portion of this emergency department evaluation and documentation was performed via [employer], a HIPAA-compliant telemedicine application, in concert with a tele-presenter in the room. A face to face patient evaluation with one of my colleagues will occur once the patient is placed in an emergency department room.      DISCLAIMER: This note was prepared with [employer] voice recognition transcription software. Garbled syntax, mangled pronouns, and other bizarre constructions may be attributed to that software system.

## 2024-03-18 NOTE — ED TRIAGE NOTES
"Pt arrived with c/o upper R sided CP and back pain, worsening since last night.  States, "It feels like someone hit me in the chest."  Pt also reports bilateral elbow pain, reports "pulsing pain to R elbow."  Denies any recent falls, trauma, or heavy lifting.  Took 4 baby ASA today.  Also took advil last night with no relief.  Endorses SOB.  Pt answering questions appropriately, speaking in complete sentences, respirations even and unlabored.  Aao x 4.    "

## 2024-03-18 NOTE — ED PROVIDER NOTES
Encounter Date: 3/18/2024    SCRIBE #1 NOTE: I, Oscarangela Polanco, am scribing for, and in the presence of,  Kaycee Ch MD.       History     Chief Complaint   Patient presents with    Neck Pain     R side neck pain radiating to upper back and down to elbow.      Time seen by provider: 1:40 PM    Chantal Snowden is a 78 y.o. female, with history of TIA one year ago, who presents to the ED with worsening right shoulder pain after waking up this morning. The patient denies any SOB, chest pain, nausea or vomiting and reports no recent falls, trauma, or heavy lifting. She denies any history of similar events. She notes analgesic use of 4 baby aspirin PTA with minimal relief. Patient also reports daily use of Gabapentin. SHx includes frequent alcohol use. This is the extent of the patient's complaints today in the Emergency Department.     The history is provided by the patient.     Review of patient's allergies indicates:  No Known Allergies  History reviewed. No pertinent past medical history.  Past Surgical History:   Procedure Laterality Date    BACK SURGERY      10 years ago    hx of wrist injection Left 04/2017    SHOULDER SURGERY Left     20 to 25 years ago    wrist sugery Right     20 years ago     History reviewed. No pertinent family history.  Social History     Tobacco Use    Smoking status: Never    Smokeless tobacco: Never   Substance Use Topics    Alcohol use: Yes     Alcohol/week: 12.0 standard drinks of alcohol     Types: 10 Glasses of wine, 2 Shots of liquor per week     Comment: soc    Drug use: No     Review of Systems   Constitutional:  Negative for fever.   HENT:  Negative for sore throat.    Respiratory:  Negative for shortness of breath.    Cardiovascular:  Negative for chest pain.   Gastrointestinal:  Negative for nausea.   Genitourinary:  Negative for dysuria.   Musculoskeletal:  Positive for arthralgias and myalgias. Negative for back pain.   Skin:  Negative for color change, rash and  wound.   Neurological:  Negative for weakness.   Hematological:  Does not bruise/bleed easily.   All other systems reviewed and are negative.      Physical Exam     Initial Vitals [03/18/24 1146]   BP Pulse Resp Temp SpO2   (!) 220/111 81 16 97.5 °F (36.4 °C) 99 %      MAP       --         Physical Exam    Nursing note and vitals reviewed.  Constitutional: She appears well-developed and well-nourished. No distress.   Patient tearful. Appears uncomfortable.   HENT:   Head: Normocephalic and atraumatic.   Right Ear: External ear normal.   Left Ear: External ear normal.   Eyes: Conjunctivae and EOM are normal. Pupils are equal, round, and reactive to light. Right eye exhibits no discharge. Left eye exhibits no discharge. No scleral icterus.   Neck: Neck supple.   Normal range of motion.  Cardiovascular:  Normal rate, regular rhythm and normal heart sounds.     Exam reveals no gallop and no friction rub.       No murmur heard.  Pulmonary/Chest: Breath sounds normal. No stridor. No respiratory distress. She has no wheezes. She has no rhonchi. She has no rales.   Abdominal: Abdomen is soft. Bowel sounds are normal. She exhibits no distension and no mass. There is no abdominal tenderness. There is no rebound and no guarding.   Musculoskeletal:         General: Normal range of motion.      Cervical back: Normal range of motion and neck supple.     Neurological: She is alert and oriented to person, place, and time. She has normal strength. No cranial nerve deficit or sensory deficit. GCS score is 15. GCS eye subscore is 4. GCS verbal subscore is 5. GCS motor subscore is 6.   Right hand with normal strength and sensation. Inability for passive range of motion of her the shoulder. Sensation along the lateral and dorsal aspect of the shoulder intact. Appears to be asymmetry of right and left shoulder.    Skin: Skin is warm and dry. Capillary refill takes less than 2 seconds.   Psychiatric: She has a normal mood and affect. Her  "behavior is normal. Judgment and thought content normal.         ED Course   Procedures  Labs Reviewed - No data to display  EKG Readings: (Independently Interpreted)   Normal sinus rhythm with rate of 80. Normal QT and WV intervals. No acute changes.     ECG Results              EKG 12-lead (Final result)        Collection Time Result Time QRS Duration OHS QTC Calculation    03/18/24 12:17:08 03/18/24 15:26:44 64 442                     Final result by Interface, Lab In [facility] (03/18/24 15:26:48)                   Narrative:    Test Reason : M25.511,    Vent. Rate : 080 BPM     Atrial Rate : 080 BPM     P-R Int : 150 ms          QRS Dur : 064 ms      QT Int : 384 ms       P-R-T Axes : 038 048 050 degrees     QTc Int : 442 ms    Normal sinus rhythm  Normal ECG    Confirmed by Blaire TAPIA, Cj JOHNSON ([id number]) on 3/18/2024 3:26:42 PM    Referred By: [email or web address]   SELF           Confirmed By:Cj Rudd MD                                  Imaging Results              CT Shoulder Without Contrast Right (Edited Result - FINAL)  Result time 03/18/24 14:48:28      Addendum (preliminary) 1 of 1 by Christoph Luna MD (03/18/24 14:48:28)      Please note, the 2nd impression should read as follows: "There is edema/induration about the right shoulder with superimposed fluid collection along the medial aspect.  In the setting of trauma, this may reflect hematoma however infection/bursitis remain a consideration, correlation is advised.  Please see above."      Electronically signed by: Christoph Luna MD  Date:    03/18/2024  Time:    14:48                 Final result by Christoph Luna MD (03/18/24 14:23:08)                   Impression:      This report was flagged in Epic as abnormal.    1. Allowing for motion artifact, no convincing acute displaced fracture or dislocation of the right shoulder noting degenerative changes.  2. There is edema/induration about the left shoulder with superimposed fluid collection along the " medial aspect.  In the setting of trauma, this may reflect hematoma however infection/bursitis remain a consideration, correlation is advised.  Please see above.      Electronically signed by: Christoph Luna MD  Date:    03/18/2024  Time:    14:23               Narrative:    EXAMINATION:  CT SHOULDER WITHOUT CONTRAST RIGHT    CLINICAL HISTORY:  Shoulder trauma, instability or dislocation suspected, xray done;    TECHNIQUE:  Axial images of the right shoulder were obtained at 1.25 mm intervals without administration of IV contrast.  Coronal and sagittal reformatted images were reviewed.    COMPARISON:  03/18/2024    FINDINGS:  The right humeral head maintains appropriate relationship with the glenoid noting glenohumeral degenerative change.  There are degenerative changes of the acromioclavicular joint.  No acute displaced fracture or dislocation of the right shoulder.  No visualized acute displaced rib fracture.  Right breast prosthesis noted.  There is a 2 mm pulmonary nodule within the anterior aspect of the right upper lobe.  There is right basilar dependent atelectasis.    There is edema/induration about the shoulder.  Along the medial aspect, there is a lobular appearing fluid collection measuring approximately 4.1 x 3.2 cm.  No significant axillary lymphadenopathy.  There is a small effusion.                                       X-Ray Shoulder Trauma Right (Final result)  Result time 03/18/24 13:28:03      Final result by Cassidy Jacobson MD (03/18/24 13:28:03)                   Impression:      No acute osseous abnormality seen.      Electronically signed by: Cassidy Jacobson  Date:    03/18/2024  Time:    13:28               Narrative:    EXAMINATION:  XR SHOULDER TRAUMA 3 VIEW RIGHT    CLINICAL HISTORY:  Pain in unspecified shoulder    TECHNIQUE:  Three or four views of the right shoulder were performed.    COMPARISON:  None    FINDINGS:  No acute fracture or dislocation seen.  No significant soft tissue  edema or radiopaque retained foreign body.    Moderate degenerative change acromioclavicular joint.    Nonspecific soft tissue calcification cephalad to the coracoid.                                       X-Ray Chest 1 View (Final result)  Result time 03/18/24 13:26:04      Final result by Cassidy Jacobson MD (03/18/24 13:26:04)                   Impression:      No acute cardiopulmonary process seen.  Hiatal hernia.      Electronically signed by: Cassidy Jacobson  Date:    03/18/2024  Time:    13:26               Narrative:    EXAMINATION:  XR CHEST 1 VIEW    CLINICAL HISTORY:  Pain in unspecified shoulder    TECHNIQUE:  Single frontal view of the chest was performed.    COMPARISON:  None    FINDINGS:  Lungs are well expanded.  No acute consolidation, pleural effusion, or pneumothorax.    Cardiac silhouette is normal in size.                                    X-Rays:   Independently Interpreted Readings:   Chest X-Ray: Chest X-Ray: No acute consolidation. No pneumothorax.       Other Readings:  Right Shoulder X-Ray: No acute fracture or dislocation.    Medications   HYDROcodone-acetaminophen 5-325 mg per tablet 1 tablet (1 tablet Oral Given 3/18/24 1313)   ketorolac injection 9.999 mg (9.999 mg Intramuscular Given 3/18/24 1313)   HYDROcodone-acetaminophen 5-325 mg per tablet 1 tablet (1 tablet Oral Given 3/18/24 1455)     Medical Decision Making  Initial Impression: Patient presents with right shoulder pain. Suspect trauma. Plan X-Ray as well as EKG and CXR due to radiation to chest area.     Differential Diagnosis: Rotator cuff injury, tendonitis, bursitis, arthritis, cellulitis, septic joint, cardiac or intraabdominal cause, cervical radiculopathy, dislocation, fracture.       Amount and/or Complexity of Data Reviewed  Radiology: ordered.     Details: CXR with cortical irregularity of R shoulder, xray added without evidence of fracture.  Due to severity of pain, CT also obtained which shows fluid surrounding  joint ?edema/hematoma but no fracture.    Discussion of management or test interpretation with external provider(s): Patient given a sling for comfort for the next couple of days.  She is given cautioned not to continue to use beyond that time number to avoid frozen shoulder.  Patient will be given short course of pain medication.  She is advised not take any sedating medications or drink any alcohol while taking this medication.  Will place referral for physical therapy.    Risk  Prescription drug management.            Scribe Attestation:   Scribe #1: I performed the above scribed service and the documentation accurately describes the services I performed. I attest to the accuracy of the note.    Physician Attestation for Scribe: I, Kaycee Ch MD, reviewed documentation as scribed in my presence, which is both accurate and complete.                Patient improved with treatment in the emergency department and comfortable going home. Discussed reasons to return and importance of followup.  Patient understands that the emergency visit today is primarily to address immediate concerns and to rule out emergent cause of symptoms and that they may require further workup and evaluation as an outpatient. All questions addressed and patient given discharge instructions and followup information.                Clinical Impression:  Final diagnoses:  [M25.511] Right shoulder pain  [M25.519] Shoulder pain  [S46.911A] Shoulder strain, right, initial encounter (Primary)          ED Disposition Condition    Discharge Stable          ED Prescriptions       Medication Sig Dispense Start Date End Date Auth. Provider    HYDROcodone-acetaminophen (NORCO) 5-325 mg per tablet Take 1 tablet by mouth every 4 (four) hours as needed for Pain. 12 tablet 3/18/2024 -- Kaycee Ch MD          Follow-up Information       Follow up With Specialties Details Why Contact Info    Luiz Hutchison III, MD Internal Medicine Schedule an appointment  as soon as possible for a visit   [address]  [facility] [address]  [facility] [address]  [phone]               Kaycee Ch MD  03/18/24 [id number]

## 2024-03-19 ENCOUNTER — NURSE TRIAGE (OUTPATIENT)
Dept: ADMINISTRATIVE | Facility: CLINIC | Age: 79
End: 2024-03-19
Payer: MEDICARE

## 2024-03-19 NOTE — TELEPHONE ENCOUNTER
[place]    PCP:  Dr. Luiz Hutchison    She reports supposed to schedule a PT appt today for her arm.  She states that she just woke up.  She reports that she collapsed 2 days ago.  C/O ongoing confusion (situation and date) ~ 2 months (she has not been evaluated for this problem), S/P fall 2 days ago, and ongoing Rt arm weakness d/t fall 2 days ago (was seen at the ED).  Denies difficulty breathing, HA, and fever.  H/O TIA and CAD.  She is oriented to person and place.  Per protocol, care advised is go to [facility]/[place] now.  Pt VU and refused care advised.  Care advised reinforced but she continues to refuse care advice.  Pt states that the confusion is not new and has been ongoing and she was just seen in the ED.  Pt wants to speak with PCP.  Message routed high priority to PCP.  Advised to call for worsening/questions/concerns.  VU.    Reason for Disposition   Patient sounds very sick or weak to the triager    Additional Information   Negative: Difficult to awaken or acting confused (disoriented, slurred speech) and has diabetes mellitus   Negative: Difficult to awaken or acting confused (disoriented, slurred speech) and new-onset   Negative: Weakness of the face, arm, or leg on one side of the body and new-onset   Negative: Numbness of the face, arm, or leg on one side of the body and new-onset   Negative: Loss of speech or garbled speech and new-onset   Negative: Difficulty breathing and bluish (or gray) lips or face   Negative: Shock suspected (e.g., cold/pale/clammy skin, too weak to stand, low BP, rapid pulse)   Negative: Seeing or hearing or feeling things that are not there (i.e., auditory, visual, or tactile hallucinations)   Negative: Followed a head injury   Negative: Drug overdose suspected   Negative: Violent behavior, or threatening to physically hurt or kill someone   Negative: Sounds like a life-threatening emergency to the triager   Negative: Headache or vomiting   Negative: Stiff neck (can't touch chin to  chest)   Negative: Very strange or paranoid behavior   Negative: Fever > 100.4 F (38.0 C)    Protocols used: Confusion - Delirium-A-OH

## 2024-03-27 ENCOUNTER — TELEPHONE (OUTPATIENT)
Dept: CARDIOLOGY | Facility: CLINIC | Age: 79
End: 2024-03-27
Payer: MEDICARE

## 2024-03-27 NOTE — TELEPHONE ENCOUNTER
----- Message from Klever Byrnes sent at 3/27/2024  2:32 PM CDT -----  Contact: pt  .Type:  Needs Medical Advice    Who Called: pt    Would the patient rather a call back or a response via [facility]?  Call back  Best Call Back Number: [phone]  Additional Information: Pt. Is requesting a call back to the nurse regarding her blood pressure readings that she was to monitor per the provider and pt. Did request a call back on yesterday.

## 2024-04-19 ENCOUNTER — TELEPHONE (OUTPATIENT)
Dept: NEUROLOGY | Facility: CLINIC | Age: 79
End: 2024-04-19
Payer: MEDICARE

## 2024-04-19 NOTE — TELEPHONE ENCOUNTER
Attempted to call provider's office that referred patient to see [facility].    LVM to nurse in regard to receive MRI images before scheduling appointment prior to scheduling appointment.

## 2024-04-19 NOTE — TELEPHONE ENCOUNTER
----- Message from Ernesto Palmer MD sent at 4/19/2024  9:48 AM CDT -----  Contact: Dr North Rivas to schedule but [place] [facility] [place] please  ----- Message -----  From: Benjamín Chang  Sent: 4/16/2024   4:27 PM CDT  To: Ernesto Palmer MD    Not sure why referral was sent to Dr. Lee (cause he doesn't treat Amnesia) but can you review referral/clinic notes uploaded in  and advise?    Patient over the age of 77 so gen neuro vs resident clinic?  ----- Message -----  From: Cathi Whitten  Sent: 4/16/2024   4:22 PM CDT  To: Jesus Pemberton Staff    4/16/24    Referral scanned into media mgr, dx R41.3- phone .[phone] (home)         Thanks    Thanks      Cathi    CC

## 2024-04-19 NOTE — TELEPHONE ENCOUNTER
Incoming call received from provider's office. Spoke with nurse Myriam.    Informed that Dr. Lee does not treat memory loss and also included that he'll be leaving [facility] in June. Informed to not send referrals to Dr. Lee anymore.    Informed Myriam that patient can see Dr. Ernesto Palmer. However, MD requests to have MRI images sent over before scheduling patient. RN will notify MD.

## 2024-11-07 ENCOUNTER — TELEPHONE (OUTPATIENT)
Dept: OBSTETRICS AND GYNECOLOGY | Facility: CLINIC | Age: 79
End: 2024-11-07
Payer: MEDICARE

## 2024-11-07 NOTE — TELEPHONE ENCOUNTER
----- Message from [email or web address] sent at 11/7/2024 10:57 AM CST -----      Name of Who is Calling: DONALD AYALA [[id number]]      What is the request in detail: Pt called to schedule an appt with referral.Please contact to further discuss and advise.          Can the clinic reply by [facility]: Y      What [facility]  [phone]

## 2024-11-07 NOTE — TELEPHONE ENCOUNTER
----- Message from Taras sent at 11/7/2024 11:46 AM CST -----  Name of Who is Calling:DONALD AYALA [[id number]]                What is the request in detail: Pt calling because she was referred from her doctor to see you. States she have a ball falling from vagina, I wasn't able to pull up any apt times for the patient.Please call back to further assist.                 Can the clinic reply by [facility]: No                What Number to Call Back [facility] not [facility]: [phone]

## 2025-01-15 ENCOUNTER — OFFICE VISIT (OUTPATIENT)
Dept: UROGYNECOLOGY | Facility: CLINIC | Age: 80
End: 2025-01-15
Payer: MEDICARE

## 2025-01-15 VITALS
WEIGHT: 107.56 LBS | DIASTOLIC BLOOD PRESSURE: 68 MMHG | HEIGHT: 63 IN | BODY MASS INDEX: 19.06 KG/M2 | SYSTOLIC BLOOD PRESSURE: 123 MMHG | HEART RATE: 76 BPM

## 2025-01-15 DIAGNOSIS — R19.8 IRREGULAR BOWEL HABITS: ICD-10-CM

## 2025-01-15 DIAGNOSIS — N81.11 CYSTOCELE, MIDLINE: ICD-10-CM

## 2025-01-15 DIAGNOSIS — Z12.31 ENCOUNTER FOR SCREENING MAMMOGRAM FOR MALIGNANT NEOPLASM OF BREAST: ICD-10-CM

## 2025-01-15 DIAGNOSIS — M25.511 ACUTE PAIN OF RIGHT SHOULDER: Primary | ICD-10-CM

## 2025-01-15 DIAGNOSIS — N81.4 UTEROVAGINAL PROLAPSE: ICD-10-CM

## 2025-01-15 DIAGNOSIS — N95.2 VAGINAL ATROPHY: ICD-10-CM

## 2025-01-15 DIAGNOSIS — R35.1 NOCTURIA MORE THAN TWICE PER NIGHT: ICD-10-CM

## 2025-01-15 DIAGNOSIS — Z12.4 CERVICAL CANCER SCREENING: ICD-10-CM

## 2025-01-15 DIAGNOSIS — N39.46 URINARY INCONTINENCE, MIXED: ICD-10-CM

## 2025-01-15 DIAGNOSIS — R41.3 MEMORY LOSS: ICD-10-CM

## 2025-01-15 DIAGNOSIS — N81.6 RECTOCELE, FEMALE: ICD-10-CM

## 2025-01-15 PROCEDURE — 51701 INSERT BLADDER CATHETER: CPT | Mod: S$PBB,,, | Performed by: OBSTETRICS & GYNECOLOGY

## 2025-01-15 PROCEDURE — 99999 PR PBB SHADOW E&M-EST. PATIENT-LVL V: CPT | Mod: PBBFAC,,, | Performed by: OBSTETRICS & GYNECOLOGY

## 2025-01-15 PROCEDURE — 87086 URINE CULTURE/COLONY COUNT: CPT | Performed by: OBSTETRICS & GYNECOLOGY

## 2025-01-15 PROCEDURE — 99215 OFFICE O/P EST HI 40 MIN: CPT | Mod: PBBFAC | Performed by: OBSTETRICS & GYNECOLOGY

## 2025-01-15 PROCEDURE — 51701 INSERT BLADDER CATHETER: CPT | Mod: PBBFAC | Performed by: OBSTETRICS & GYNECOLOGY

## 2025-01-15 PROCEDURE — 88175 CYTOPATH C/V AUTO FLUID REDO: CPT | Performed by: OBSTETRICS & GYNECOLOGY

## 2025-01-15 PROCEDURE — 99205 OFFICE O/P NEW HI 60 MIN: CPT | Mod: S$PBB,25,, | Performed by: OBSTETRICS & GYNECOLOGY

## 2025-01-15 RX ORDER — ESTRADIOL 0.1 MG/G
CREAM VAGINAL
Qty: 42.5 G | Refills: 11 | Status: SHIPPED | OUTPATIENT
Start: 2025-01-15

## 2025-01-15 NOTE — PROGRESS NOTES
[facility] - [facility]TH [place]  [address]  [facility] [address]    Chantal Snowden  [id number]  1945  January 15, 2025    Consulting Physician: Self, Aaareferral   GYN: none  Primary M.D.: needs new one    Chief Complaint   Patient presents with    Vaginal Prolapse       HPI:     1)  UI:  (+) NGUYỄN < (+) UUI.  (--) pads.  +underwear changes 2x/month.   Daytime frequency: Q 1-2 hours.  Nocturia: Yes: 2/night.   (--) dysuria,  (--) hematuria,  (--) frequent UTIs.  (+) complete bladder emptying.  --tried OAB med in past--didn't like SE (nausea)    2)  POP:  Present. below introitus, intermittent.  Symptoms:(+)  discomfort.  (--) vaginal bleeding. (--) vaginal discharge. (--) sexually active.  (--) h/o dyspareunia.  (--)  Vaginal dryness.  (--) vaginal estrogen use.     3)  BM:  (+) constipation/straining, intermittent.  (--) chronic diarrhea. (--) hematochezia.  (--) fecal incontinence.  (--) fecal smearing/urgency.  (+) complete evacuation.     Past Medical History  History reviewed. No pertinent past medical history.   GERD  HLD  Insomnia: gabapentin; not taking trazodone  Recent fall with R shoulder pain: taking narocotics PRN; is losing some strength some; had steroid injection--really helped; also fell on wrist & having pain    Past Surgical History  Past Surgical History:   Procedure Laterality Date    BACK SURGERY      10 years ago    hx of wrist injection Left 2017    SHOULDER SURGERY Left     20 to 25 years ago    wrist sugery Right     20 years ago   Xlap/Pfannenstiel ? L dermoid cyst removal    Hysterectomy: No    Past Ob History      Gynecologic History  LMP: No LMP recorded. Patient has had a hysterectomy.  Age of menarche: 14 yo  Age of menopause: 53 yo  Menstrual history: h/o normal  Pap test: years ago.  History of abnormal paps: No.  History of STIs:  No  Mammogram: Date of last: 2024.  Result: The breasts are heterogeneously dense, which may obscure small masses. (c)There is  no evidence of suspicious masses, microcalcifications or architectural distortion. There are bilateral implants and associated postsurgical changes from prior mammoplasty.   Colonoscopy: Date of last: years agoa.  Result:  normal per report.  Repeat due:  per PCP.    DEXA:  Date of last: 2/2024.  Result:  normal.  Repeat due:  per PCP.     Family History  No family history on file.   Colon CA: No  Breast CA: Yes - MGA  GYN CA: No   CA: No    Social History  Social History     Tobacco Use   Smoking Status Never   Smokeless Tobacco Never     Social History     Substance and Sexual Activity   Alcohol Use Yes    Alcohol/week: 12.0 standard drinks of alcohol    Types: 10 Glasses of wine, 2 Shots of liquor per week    Comment: soc   .    Social History     Substance and Sexual Activity   Drug Use No     The patient is single.  Resides in [place] [address].  Employment status: retired  for [employer].     Allergies  Review of patient's allergies indicates:  No Known Allergies    Medications  Current Outpatient Medications on File Prior to Visit   Medication Sig Dispense Refill    gabapentin (NEURONTIN) 100 MG capsule Take 1 capsule (100 mg total) by mouth 3 (three) times daily. (Patient taking differently: Take 100 mg by mouth 3 (three) times daily. Twice a month for sleep) 90 capsule 11    omeprazole (PRILOSEC) 20 MG capsule TAKE 1 CAPSULE BY MOUTH EVERY DAY BEFORE A MEAL  3    rosuvastatin (CRESTOR) 20 MG tablet Take 1 tablet (20 mg total) by mouth once daily. 90 tablet 3    estrogen, conjugated,-medroxyprogesterone 0.3-1.5 mg (PREMPRO) 0.3-1.5 mg per tablet Take 1 tablet by mouth once daily. Every 2-3 days (Patient not taking: Reported on 1/15/2025)      HYDROcodone-acetaminophen (NORCO) 5-325 mg per tablet Take 1 tablet by mouth every 4 (four) hours as needed for Pain. (Patient not taking: Reported on 1/15/2025) 12 tablet 0    traZODone (DESYREL) 50 MG tablet Take 50 mg by mouth every evening. rarely  "(Patient not taking: Reported on 1/15/2025)      UNABLE TO FIND TURMERIC FORCE,  Pt states is a capsule she takes daily, not sure of MG. Pt states takes it for inflammation. (Patient not taking: Reported on 1/15/2025)       No current facility-administered medications on file prior to visit.       Review of Systems A 14 point ROS was reviewed with pertinent positives as noted above in the history of present illness.      Constitutional: negative  Eyes: negative  Endocrine: negative  Gastrointestinal: negative  Cardiovascular: negative  Respiratory: negative  Allergic/Immunologic: negative  Integumentary: negative  Psychiatric: negative  Musculoskeletal: negative   Ear/Nose/Throat: negative  Neurologic: negative  Genitourinary: SEE HPI  Hematologic/Lymphatic: negative   Breast: negative    Urogynecologic Exam  /68   Pulse 76   Ht 5' 3" (1.6 m)   Wt 48.8 kg (107 lb 9.4 oz)   BMI 19.06 kg/m²     GENERAL APPEARANCE:  The patient is well-developed, well-nourished.   Neck:  Supple with no thyromegaly, no carotid bruits.  Heart:  Regular rate and rhythm, no murmurs, rubs or gallops.  Lungs:  Clear.  No CVA tenderness.  Abdomen:  Soft, nontender, nondistended, no hepatosplenomegaly.  Incisions:  none    PELVIC:    External genitalia:  Normal Bartholins, Skenes and labia bilaterally.    Urethra:  No caruncle, diverticulum or masses.  (+) hypermobility.    Vagina:  Atrophy (+) , no bladder masses or tender, no discharge.    Cervix:  normal appearance  Uterus: normal size, contour, position, consistency, mobility, non-tender  Adnexa: Not palpable.    POP-Q:  Aa +1; Ba +1; C -3; Ap 0; Bp 0; D -6.  Genital hiatus 4, perineal body 2, total vaginal length 10-11 (standing)     NEUROLOGIC:  Cranial nerves 2 through 12 intact.  Strength 5/5.  DTRs 2+ lower extremities.  S2 through 4 normal.  Sacral reflexes intact.    EXT: VALLEJO, 2+ pulses bilaterally, no C/C/E    COUGH STRESS TEST:  negative  KEGEL: 1 /5    RECTAL:  "   External:  Normal, (--) hemorrhoids, (--) dovetailing.   Internal:   (--) tenderness, (--) masses, Normal resting tone, Normal active tone. Grossly heme NEG.     PVR: 20 mL    Impression    1. Acute pain of right shoulder    2. Encounter for screening mammogram for malignant neoplasm of breast    3. Urinary incontinence, mixed    4. Memory loss    5. Cervical cancer screening    6. Vaginal atrophy    7. Cystocele, midline    8. Rectocele, female    9. Uterovaginal prolapse    10. Nocturia more than twice per night    11. Irregular bowel habits        Initial Plan  The patient was counseled regarding these issues. The patient was given a summary sheet containing each of these issues with possible options for evaluation and management. When appropriate, we also reviewed computer-generated diagrams specific to their diagnoses..  All questions were addressed to the patient's satisfaction.    1)  Stage 2 cystocele/rectocele, stage 1 uterovaginal prolapse:  --discussed  --options: observation vs PT vs pessary vs surgery  --if surgery: vaginal hysterectomy, consider removal of tubes/ovaries (no major RF for leaving), uterosacral suspension, anterior/posterior repair  --if surgery:  pelvic US   --if surgery:  bladder testing to see if need treatment for stress incontinence (bulking or sling)  --if surgery:  clearance per PCP + labs (CBC, CMP, T&S)/EKG  --if surgery: pap smear + HPV done today  --Start PT in [place]. Will call you with number to call.     2)  Mixed urinary incontinence, urge > stress:    --urine C&S  --Empty bladder every 3 hours.  Empty well: wait a minute, lean forward on toilet.    --Avoid dietary irritants (see sheet).  Keep diary x 3-5 days to determine your irritants.  --KEGELS: do 10 in AM and 10 in PM, holding each x 10 seconds.  When you feel urge to go, STOP, KEGEL, and when urge has passed, then go to bathroom.  Start PT in [place]. Options:  --[facility]: Call: [phone]. Fax Rx: [phone]   --  [facility]  [address] [place] [address]  Phone: [phone]  Fax: [phone]  --URGE: consider medication in future. Takes 2-4 weeks to see if will have effect.  For dry mouth: get sour, sugar free lozenge or gum.    --STRESS:  Pessary vs. Sling vs bulking.      3)  Nocturia (nighttime urination):   --stop fluids 2 hours before bed.    --no water by bed  --If have leg swelling:  Elevate feet above chest x 1 hour before bed to get excess fluid off.  Can also use support hose (knee highs).      4)  Intermittent constipation:  --hydrate well  --straining can make prolapse worse  Controlling constipation may help bladder urgency/leakage and fiber may better control cholesterol and blood glucose.  Start daily fiber.  Take 1 tsp of fiber powder (psyllium or other sugar-free powder).  Mix in 8 oz of water.  Take x 3-5 days.  Then, increase fiber by 1 tsp every 3-5 days until stool is easy to pass.  Stop and continue at that dose.   Do not exceed 6 tsps/day.  May also use over the counter stool softener 1-2 x/day.  AVOID laxatives.    5) Vaginal atrophy (dryness):    --Vaginal estrogen:  --Use 0.5 grams of estrogen cream in vagina with applicator or dime-sized amount with finger (as far as can reach internally) nightly x 2 weeks, then twice a week thereafter.  You can also apply a dime-sized amount with your finger around the vaginal opening and inner lips at same frequency.     --Vaginal estrogen may help to decrease pain related to dryness with intercourse and urinary symptoms (urgency/frequency/UTIs) around menopause.     6)  Well-woman:  --needs new PCP--schedule at [facility]  --schedule mammogram 2/8/2025 or after  --discuss colon cancer screening with PCP    7) R shoulder and wrist pain:  --ortho consult    8)  Memory issues:  --long and short term x 6 months  --neurology consult    9)  RTC 6 months with NP or PA.     Approximately 60 min were spent in consult, 90 % in discussion.      Thank you for requesting consultation of your patient.  I look forward to participating in their care.    Ana M Geller  Female Pelvic Medicine and Reconstructive Surgery  [facility] [place], [place]

## 2025-01-15 NOTE — PATIENT INSTRUCTIONS
Bladder Irritants  Certain foods and drinks have been associated with worsening symptoms of urinary frequency, urgency, urge incontinence, or bladder pain. If you suffer from any of these conditions, you may wish to try eliminating one or more of these foods from your diet and see if your symptoms improve. If bladder symptoms are related to dietary factors, strict adherence to a diet thateliminates the food should bring marked relief in 10 days. Once you are feeling better, you can begin to add foods back into your diet, one at a time. If symptoms return, you will be able to identify the irritant. As you add foods back to your diet it is very important that you drink significant amounts of water.    -----------------------------------------------------------------------------------------------  List of Common Bladder Irritants*  Alcoholic beverages  Apples and apple juice  Cantaloupe  Carbonated beverages  Chili and spicy foods  Chocolate  Citrus fruit  Coffee (including decaffeinated)  Cranberries and cranberry juice  Grapes  Guava  Milk Products: milk, cheese, cottage cheese, yogurt, ice cream  Peaches  Pineapple  Plums  Strawberries  Sugar especially artificial sweeteners, saccharin, aspartame, corn sweeteners, honey, fructose, sucrose, lactose  Tea  Tomatoes and tomato juice  Vitamin B complex  Vinegar  *Most people are not sensitive to ALL of these products; your goal is to find the foods that make YOUR symptoms worse.  ---------------------------------------------------------------------------------------------------    Low-acid fruit substitutions include apricots, papaya, pears and watermelon. Coffee drinkers can drink Kava or other lowacid instant drinks. Tea drinkers can substitute non-citrus herbal and sun brewed teas. Calcium carbonate co-buffered with calcium ascorbate can be substituted for Vitamin C. Prelief is a dietary supplement that works as an acid blocker for the bladder.    Where to get more  information:        Overcoming Bladder Disorders by Valerie Rutledge and Shira Pace, 1990        You Dont Have to Live with Cystitis! By Ursula Ruff, 1988  http://www.urologymanagement.org/oab  ---------------------------------------------------------------------------------    Fiber Information Sheet  Your doctor has recommended that you follow a high fiber diet. The addition of fiber to your diet can make an enormous difference in your bowel control and regularity. Fiber helps people whether they lose stool or have trouble with constipation. Fiber works by bulking the stool and keeping it formed, yet making the movement soft and easy to pass. Fiber helps keep moisture within the stool so that neither diarrhea nor hard stool occurs. Fiber makes the bowels work more regularly, but it is not a laxative. An additional bonus from eating a high fiber diet is that your risk of cancer is reduced, too.    Most of us eat some high fiber foods already, but nearly all of us do not eat the necessary amount. For example, a slice of whole wheat bread contains only about 10% of the daily recommended amount of fiber. This means if you are relying on only whole wheat bread to meet the recommended fiber requirements, you would need to eat  between 10-18 slices every day! Please note that fiber is NOT in any meat or dairy product. It is only found in grains, vegetables and fruits. The recommended daily fiber intake is 20-25 grams. Foods having high fiber content include:     Fiber One Cereal, ½ cup 13.0 g   Duran beans, ¾ cup 10.4 g   Wheat bran cereal, 1 oz 10.0 g   Kidney beans, ¾ cup 9.3 g   All Bran Cereal, ½ cup 6.0 g   Oat Bran Cereal, hot, 1 oz 4.0 g   Banana, 1medium 3.8 g   Canned pears, ½ cup 3.7 g   3 prunes or ¼ cup raisins 3.5 g   Whole Wheat Total, 1 cup 3.0 g   Carrots, ½ cup 3.2 g   Apple, small 2.8 g   Broccoli, ½ cup 2.8 g   Cauliflower, ½ cup 2.6 g   Oatmeal, 1 oz 2.5 g    Whole Wheat Toast 2.0 g   Cheerios, 1 1/3 cup 2.0 g   Baked potato with skin 2.0 g   Corn, ½ cup 1.9 g   Popcorn, 3 cups 1.9 g   Orange, medium 1.9 g   Granola bar 1.0 g   Lettuce, ½ cup 0.9 g    If you dont think that you can get enough fiber through your everyday diet, there are many good fiber supplements you can take along with eating your high fiber diet. Some of these are: Metamucil (1 heaping teaspoon or 1-2 wafers), Citrucel (1 tablespoon), Fiberall (1-2 wafers or 1 teaspoon), Perdium (2 rounded teaspoons) and 1-2 teaspoons unprocessed bran (to mix with foods)    You may need to use the fiber supplement up to 3-4 times daily to produce normal elimination. Please follow specific package directions or call us for help in regulating the dose. You may notice some bloating and/or increased gas at first. These symptoms can be relieved by adding fiber to your diet slowly. Once your body gets used to this increased fiber, these symptoms will go away.   ----------------------------------------------  1)  Stage 2 cystocele/rectocele, stage 1 uterovaginal prolapse:  --discussed  --options: observation vs PT vs pessary vs surgery  --if surgery: vaginal hysterectomy, consider removal of tubes/ovaries (no major RF for leaving), uterosacral suspension, anterior/posterior repair  --if surgery:  pelvic US   --if surgery:  bladder testing to see if need treatment for stress incontinence (bulking or sling)  --if surgery:  clearance per PCP + labs (CBC, CMP, T&S)/EKG  --if surgery: pap smear + HPV done today  --Start PT in [place]. Will call you with number to call.     2)  Mixed urinary incontinence, urge > stress:    --urine C&S  --Empty bladder every 3 hours.  Empty well: wait a minute, lean forward on toilet.    --Avoid dietary irritants (see sheet).  Keep diary x 3-5 days to determine your irritants.  --KEGELS: do 10 in AM and 10 in PM, holding each x 10 seconds.  When you feel urge to go, STOP, KEGEL, and when urge has  passed, then go to bathroom.  Start PT in [place]. Will call you with number to call.   --URGE: consider medication in future. Takes 2-4 weeks to see if will have effect.  For dry mouth: get sour, sugar free lozenge or gum.    --STRESS:  Pessary vs. Sling vs bulking.      3)  Nocturia (nighttime urination):   --stop fluids 2 hours before bed.    --no water by bed  --If have leg swelling:  Elevate feet above chest x 1 hour before bed to get excess fluid off.  Can also use support hose (knee highs).      4)  Intermittent constipation:  --hydrate well  --straining can make prolapse worse  Controlling constipation may help bladder urgency/leakage and fiber may better control cholesterol and blood glucose.  Start daily fiber.  Take 1 tsp of fiber powder (psyllium or other sugar-free powder).  Mix in 8 oz of water.  Take x 3-5 days.  Then, increase fiber by 1 tsp every 3-5 days until stool is easy to pass.  Stop and continue at that dose.   Do not exceed 6 tsps/day.  May also use over the counter stool softener 1-2 x/day.  AVOID laxatives.    5) Vaginal atrophy (dryness):    --Vaginal estrogen:  --Use 0.5 grams of estrogen cream in vagina with applicator or dime-sized amount with finger (as far as can reach internally) nightly x 2 weeks, then twice a week thereafter.  You can also apply a dime-sized amount with your finger around the vaginal opening and inner lips at same frequency.     --Vaginal estrogen may help to decrease pain related to dryness with intercourse and urinary symptoms (urgency/frequency/UTIs) around menopause.     6)  Well-woman:  --needs new PCP--schedule at [facility]  --schedule mammogram 2/8/2025 or after  --discuss colon cancer screening with PCP    7) R shoulder and wrist pain:  --ortho consult    8)  Memory issues:  --long and short term x 6 months  --neurology consult    9)   RTC 6 months with NP or PA.

## 2025-01-16 ENCOUNTER — TELEPHONE (OUTPATIENT)
Dept: ADMINISTRATIVE | Facility: OTHER | Age: 80
End: 2025-01-16
Payer: MEDICARE

## 2025-01-16 LAB — BACTERIA UR CULT: NO GROWTH

## 2025-01-16 NOTE — TELEPHONE ENCOUNTER
Attempted to call pt to reschedule visit she missed today and go over how to access the [employer] portal. No answer, lvm

## 2025-01-16 NOTE — TELEPHONE ENCOUNTER
----- Message from Jaja sent at 1/16/2025  7:40 AM CST -----       Nature of Call: requesting a call back     Best Call Back Number: [phone]     Additional Information:  DONALD AYALA calling regarding Patient Advice for stating she was scheduled for this appt and was not told of how to do a virtual visit or how to set it up, or told any information about it according to the PT. Please call PT to assist with this matter

## 2025-01-16 NOTE — TELEPHONE ENCOUNTER
Spoke to pt and got her rescheduled for her VV with Dr. Richmond and went over how to access the portal for her appointment.

## 2025-01-17 ENCOUNTER — TELEPHONE (OUTPATIENT)
Dept: UROGYNECOLOGY | Facility: CLINIC | Age: 80
End: 2025-01-17
Payer: MEDICARE

## 2025-01-17 NOTE — TELEPHONE ENCOUNTER
"Contacted patient to relay all information below per Dr. Geller- patient wrote down all PT information. Patient denied wanting to be scheduled with PCP or ortho doctor in [place]. Patient reporting she would like to see those doctors in [place]. Informed patient will notify Dr. Geller. Patient also requesting "a shot for her shoulder pain." Informed patient we do not do those in office she will need to see either a ortho doctor or urgent care. Patient stating she will wait till she get to [place].  Call ended.     ----- Message from Ana M Geller MD sent at 1/15/2025  8:35 PM CST -----  Regarding: please help patient make appts and give her pelvic floor PT info  Please call patient and help her make the following appts:  1)  neurology (with CONRADO Rich)  2)  PCP at [facility]  3)  Ortho for shoulder pain    Also, please give her this pelvic floor PT information. She can call and set up PT appt in [place], [place].  1)  [facility]: Call: [phone]. Fax Rx: [phone]   2)  [facility]  [address] [place] [address]  Phone: [phone]  Fax: [phone]    THanks!  "

## 2025-01-27 LAB
FINAL PATHOLOGIC DIAGNOSIS: NORMAL
Lab: NORMAL

## 2025-01-28 ENCOUNTER — TELEPHONE (OUTPATIENT)
Dept: NEUROLOGY | Facility: CLINIC | Age: 80
End: 2025-01-28
Payer: MEDICARE

## 2025-01-28 ENCOUNTER — OFFICE VISIT (OUTPATIENT)
Dept: NEUROLOGY | Facility: CLINIC | Age: 80
End: 2025-01-28
Payer: MEDICARE

## 2025-01-28 DIAGNOSIS — R41.3 MEMORY LOSS: ICD-10-CM

## 2025-01-28 DIAGNOSIS — R29.818 NEUROCOGNITIVE DEFICITS: Primary | ICD-10-CM

## 2025-01-28 DIAGNOSIS — R47.89 WORD FINDING DIFFICULTY: ICD-10-CM

## 2025-01-28 DIAGNOSIS — R41.89 NEUROCOGNITIVE DEFICITS: Primary | ICD-10-CM

## 2025-01-28 PROCEDURE — 98010 SYNCH AUDIO-ONLY NEW MOD 45: CPT | Mod: 93,,, | Performed by: PSYCHIATRY & NEUROLOGY

## 2025-01-28 NOTE — TELEPHONE ENCOUNTER
"Spoke to the pt to assist with getting her signed into her VV with Dr. Richmond today. Pt had no one to assist her and stated that she is struggling with "these words" when trying to navigate the portal. I tried to ask the pt what does her screen currently say and she began to get overwhelmed and began crying to me on the phone. I advised her that we will get her taken care of and to not worry. I then advised that I will message the provider to see if he can give her a phone call.   Dr. Richmond then stated he will give her call  "

## 2025-01-28 NOTE — PROGRESS NOTES
Audio Only Telehealth Visit     The patient location is: Home  The chief complaint leading to consultation is: memory loss, word finding difficulty   Visit type: Virtual visit with audio only (telephone)  Total time spent in medical discussion with patient: 30 mins minutes  Total time spent on date of the encounter:45 minutes       The reason for the audio only service rather than synchronous audio and video virtual visit was related to technical difficulties or patient preference/necessity.       Each patient to whom I provide medical services by telemedicine is:  (1) informed of the relationship between the physician and patient and the respective role of any other health care provider with respect to management of the patient; and (2) notified that they may decline to receive medical services by telemedicine and may withdraw from such care at any time. Patient verbally consented to receive this service via voice-only telephone call.       HPI:  Patient is a 79-year-old female who presents to tele Neurology Clinic as an audio only consult due to having trouble joining virtually who presents due to memory loss.  Patient states for the last 6 months she has been having word-finding difficulties and can complete sentences.  She does not have any family in the area nor does she interact with many people.  She lives at home on her own and has frequent trouble completing her sentences.  She bought a house in [place] and goes out there once in awhile.  She states she has anxiety but her depression is not out of control.  She states that for the past 6 months her symptoms have been getting worse.    Exam- 2/3 word recall  Orientation- got year and day of the week wrong  Some expressive aphasia at times  No dysarthria      Assessment and plan:  Given the above issues of word-finding and memory loss, we will initiate a neurocognitive workup including MRI, neurocognitive testing and labs.    MRI brain, MRA brain and  neck  Neurocog testing  Memory labs    F/u with me after in 2-3 months                        This service was not originating from a related E/M service provided within the previous 7 days nor will  to an E/M service or procedure within the next 24 hours or my soonest available appointment.  Prevailing standard of care was able to be met in this audio-only visit.

## 2025-01-28 NOTE — TELEPHONE ENCOUNTER
Attempted to call pt to assist with logging into her VV with Dr. Richmond for 11am, no answer, lvm

## 2025-02-03 ENCOUNTER — TELEPHONE (OUTPATIENT)
Dept: NEUROLOGY | Facility: CLINIC | Age: 80
End: 2025-02-03
Payer: MEDICARE

## 2025-02-10 ENCOUNTER — TELEPHONE (OUTPATIENT)
Dept: UROGYNECOLOGY | Facility: CLINIC | Age: 80
End: 2025-02-10
Payer: MEDICARE

## 2025-02-10 NOTE — TELEPHONE ENCOUNTER
Attempted to contact patient, no answer. LVM    ----- Message from Ana M Geller MD sent at 2/9/2025  8:15 PM CST -----  Please let patient know pap smear was normal, and HPV was negative.

## 2025-02-17 ENCOUNTER — HOSPITAL ENCOUNTER (OUTPATIENT)
Dept: RADIOLOGY | Facility: HOSPITAL | Age: 80
Discharge: HOME | End: 2025-02-17
Attending: PSYCHIATRY & NEUROLOGY
Payer: MEDICARE

## 2025-02-17 DIAGNOSIS — R41.89 NEUROCOGNITIVE DEFICITS: ICD-10-CM

## 2025-02-17 DIAGNOSIS — R29.818 NEUROCOGNITIVE DEFICITS: ICD-10-CM

## 2025-02-17 DIAGNOSIS — R47.89 WORD FINDING DIFFICULTY: ICD-10-CM

## 2025-02-17 DIAGNOSIS — R41.3 MEMORY LOSS: ICD-10-CM

## 2025-02-17 PROCEDURE — 70551 MRI BRAIN STEM W/O DYE: CPT | Mod: TC

## 2025-02-17 PROCEDURE — 70544 MR ANGIOGRAPHY HEAD W/O DYE: CPT | Mod: TC

## 2025-02-17 PROCEDURE — 70547 MR ANGIOGRAPHY NECK W/O DYE: CPT | Mod: TC

## 2025-03-01 ENCOUNTER — RESULTS FOLLOW-UP (OUTPATIENT)
Dept: NEUROLOGY | Facility: HOSPITAL | Age: 80
End: 2025-03-01

## 2025-03-01 DIAGNOSIS — R47.89 WORD FINDING DIFFICULTY: ICD-10-CM

## 2025-03-01 DIAGNOSIS — R90.89 ABNORMAL MRA, BRAIN: Primary | ICD-10-CM

## 2025-03-11 ENCOUNTER — TELEPHONE (OUTPATIENT)
Dept: NEUROLOGY | Facility: CLINIC | Age: 80
End: 2025-03-11
Payer: MEDICARE

## 2025-03-13 ENCOUNTER — HOSPITAL ENCOUNTER (OUTPATIENT)
Dept: RADIOLOGY | Facility: HOSPITAL | Age: 80
Discharge: HOME | End: 2025-03-13
Attending: PSYCHIATRY & NEUROLOGY
Payer: MEDICARE

## 2025-03-13 DIAGNOSIS — R47.89 WORD FINDING DIFFICULTY: ICD-10-CM

## 2025-03-13 DIAGNOSIS — R90.89 ABNORMAL MRA, BRAIN: ICD-10-CM

## 2025-03-13 PROCEDURE — 70498 CT ANGIOGRAPHY NECK: CPT | Mod: TC

## 2025-03-13 PROCEDURE — 25500020 PHARM REV CODE 255: Performed by: PSYCHIATRY & NEUROLOGY

## 2025-03-13 PROCEDURE — 70496 CT ANGIOGRAPHY HEAD: CPT | Mod: 26,,, | Performed by: RADIOLOGY

## 2025-03-13 PROCEDURE — 70498 CT ANGIOGRAPHY NECK: CPT | Mod: 26,,, | Performed by: RADIOLOGY

## 2025-03-13 RX ADMIN — IOHEXOL 75 ML: 350 INJECTION, SOLUTION INTRAVENOUS at 01:03

## 2025-03-25 ENCOUNTER — TELEPHONE (OUTPATIENT)
Dept: NEUROLOGY | Facility: CLINIC | Age: 80
End: 2025-03-25
Payer: MEDICARE

## 2025-03-25 NOTE — TELEPHONE ENCOUNTER
Spoke to pt and got her imaging results follow up scheduled, I asked pt if she had anyone to assist her with logging on to her VV and she said no and that she does not know how to do all of that and I advised that I can reach out to see if we cn have her seen in person but pt preferred having virtual appointment for this Friday to go over her results and I advised  that I would reach out to the team to have someone call her before her appointment to help guide her on to the call.

## 2025-03-25 NOTE — TELEPHONE ENCOUNTER
----- Message from Ketty sent at 3/24/2025 10:29 AM CDT -----  Regarding: Pt called states she wld like to speak with someone regarding results of her CT Scan  Contact: [phone]  Name of Who is Calling:DONALD AYALA [[id number]]  What is the request in detail:Pt called states she wld like to speak with someone regarding results of her CT Scan. Please advise   Can the clinic reply by [facility]:No  What Number to Call Back if not in [facility]: Telephone Information:Mobile          [phone]

## 2025-03-28 ENCOUNTER — OFFICE VISIT (OUTPATIENT)
Dept: NEUROLOGY | Facility: CLINIC | Age: 80
End: 2025-03-28
Payer: MEDICARE

## 2025-03-28 DIAGNOSIS — I67.2 INTRACRANIAL ATHEROSCLEROSIS: Primary | ICD-10-CM

## 2025-03-28 RX ORDER — CLOPIDOGREL BISULFATE 75 MG/1
75 TABLET ORAL DAILY
Qty: 30 TABLET | Refills: 0 | Status: SHIPPED | OUTPATIENT
Start: 2025-03-28 | End: 2025-04-27

## 2025-03-28 RX ORDER — ATORVASTATIN CALCIUM 40 MG/1
40 TABLET, FILM COATED ORAL DAILY
Qty: 90 TABLET | Refills: 3 | Status: CANCELLED | OUTPATIENT
Start: 2025-03-28 | End: 2026-03-28

## 2025-03-28 NOTE — Clinical Note
F/u with vascular neurology in clinic She needs to be called regarding Neuropsych testing- she has not been called abt this yet. Needs to be done ASAP  F/u with me after NP testing com

## 2025-03-28 NOTE — PROGRESS NOTES
Audio Only Telehealth Visit     The patient location is: Home  The chief complaint leading to consultation is: memory loss, word finding difficulty   Visit type: Virtual visit with audio only (telephone)  Total time spent in medical discussion with patient: 30 mins minutes  Total time spent on date of the encounter:5 minutes       The reason for the audio only service rather than synchronous audio and video virtual visit was related to technical difficulties or patient preference/necessity.       Each patient to whom I provide medical services by telemedicine is:  (1) informed of the relationship between the physician and patient and the respective role of any other health care provider with respect to management of the patient; and (2) notified that they may decline to receive medical services by telemedicine and may withdraw from such care at any time. Patient verbally consented to receive this service via voice-only telephone call.      Interval History- We went over CTA results which show large R VA plaque with significant stenosis. She has not gotten NP testing done yet.      HPI:  Patient is a 79-year-old female who presents to tele Neurology Clinic as an audio only consult due to having trouble joining virtually who presents due to memory loss.  Patient states for the last 6 months she has been having word-finding difficulties and can complete sentences.  She does not have any family in the area nor does she interact with many people.  She lives at home on her own and has frequent trouble completing her sentences.  She bought a house in [place] and goes out there once in awhile.  She states she has anxiety but her depression is not out of control.  She states that for the past 6 months her symptoms have been getting worse.    Exam- 2/3 word recall  Orientation- got year and day of the week wrong  Some expressive aphasia at times  No dysarthria      Assessment and plan:  Given the above issues of word-finding  and memory loss, we will initiate a neurocognitive workup. MRi was neg for stroke. 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 Will start patient on DAPT and lipitor and have patient f/u with vascular neurology.     Neuropsych testing for word finding  DAPT x 30 days then monotherapy with aspirin 81 mg daily  Continue crestor  Lipid panel  A1C    F/u with me after in 2-3 months  F/u with vascular neurology for R VA stenosis                        This service was not originating from a related E/M service provided within the previous 7 days nor will  to an E/M service or procedure within the next 24 hours or my soonest available appointment.  Prevailing standard of care was able to be met in this audio-only visit.

## 2025-05-05 ENCOUNTER — TELEPHONE (OUTPATIENT)
Dept: UROGYNECOLOGY | Facility: CLINIC | Age: 80
End: 2025-05-05
Payer: MEDICARE

## 2025-09-02 DIAGNOSIS — R52 PAIN: Primary | ICD-10-CM

## 2025-09-03 ENCOUNTER — TELEPHONE (OUTPATIENT)
Dept: ORTHOPEDICS | Facility: CLINIC | Age: 80
End: 2025-09-03
Payer: MEDICARE

## 2025-09-03 ENCOUNTER — HOSPITAL ENCOUNTER (OUTPATIENT)
Dept: RADIOLOGY | Facility: OTHER | Age: 80
Discharge: HOME | End: 2025-09-03
Attending: PHYSICIAN ASSISTANT
Payer: MEDICARE

## 2025-09-03 ENCOUNTER — OFFICE VISIT (OUTPATIENT)
Dept: ORTHOPEDICS | Facility: CLINIC | Age: 80
End: 2025-09-03
Payer: MEDICARE

## 2025-09-03 VITALS — HEIGHT: 63 IN | BODY MASS INDEX: 18.3 KG/M2 | WEIGHT: 103.31 LBS

## 2025-09-03 DIAGNOSIS — R41.89 IMPAIRED COGNITION: ICD-10-CM

## 2025-09-03 DIAGNOSIS — R52 PAIN: ICD-10-CM

## 2025-09-03 DIAGNOSIS — M72.0 DUPUYTREN'S DISEASE OF PALM WITH NODULES WITHOUT CONTRACTURE: ICD-10-CM

## 2025-09-03 DIAGNOSIS — M25.532 LEFT WRIST PAIN: ICD-10-CM

## 2025-09-03 DIAGNOSIS — M18.12 ARTHRITIS OF CARPOMETACARPAL (CMC) JOINT OF LEFT THUMB: Primary | ICD-10-CM

## 2025-09-03 PROCEDURE — 73130 X-RAY EXAM OF HAND: CPT | Mod: 26,LT,, | Performed by: RADIOLOGY

## 2025-09-03 PROCEDURE — 73130 X-RAY EXAM OF HAND: CPT | Mod: TC,LT

## 2025-09-03 PROCEDURE — 99204 OFFICE O/P NEW MOD 45 MIN: CPT | Mod: S$PBB,,, | Performed by: PHYSICIAN ASSISTANT

## 2025-09-03 PROCEDURE — 99213 OFFICE O/P EST LOW 20 MIN: CPT | Mod: PBBFAC,25 | Performed by: PHYSICIAN ASSISTANT

## 2025-09-03 PROCEDURE — 99999 PR PBB SHADOW E&M-EST. PATIENT-LVL III: CPT | Mod: PBBFAC,,, | Performed by: PHYSICIAN ASSISTANT

## 2025-09-03 RX ORDER — MELOXICAM 15 MG/1
15 TABLET ORAL DAILY
Qty: 30 TABLET | Refills: 0 | Status: SHIPPED | OUTPATIENT
Start: 2025-09-03 | End: 2025-10-03

## 2025-09-03 RX ORDER — DICLOFENAC SODIUM 10 MG/G
2 GEL TOPICAL 4 TIMES DAILY
Qty: 150 G | Refills: 0 | Status: SHIPPED | OUTPATIENT
Start: 2025-09-03 | End: 2025-10-03